# Patient Record
Sex: FEMALE | Race: WHITE | Employment: FULL TIME | URBAN - METROPOLITAN AREA
[De-identification: names, ages, dates, MRNs, and addresses within clinical notes are randomized per-mention and may not be internally consistent; named-entity substitution may affect disease eponyms.]

---

## 2019-01-24 ENCOUNTER — HOSPITAL ENCOUNTER (OUTPATIENT)
Age: 34
Setting detail: SPECIMEN
Discharge: HOME | End: 2019-01-24
Payer: MEDICARE

## 2019-01-24 ENCOUNTER — OFFICE VISIT (OUTPATIENT)
Dept: OBGYN CLINIC | Age: 34
End: 2019-01-24
Payer: MEDICARE

## 2019-01-24 VITALS
BODY MASS INDEX: 30.98 KG/M2 | DIASTOLIC BLOOD PRESSURE: 79 MMHG | HEART RATE: 80 BPM | TEMPERATURE: 98 F | RESPIRATION RATE: 18 BRPM | HEIGHT: 67 IN | WEIGHT: 197.4 LBS | SYSTOLIC BLOOD PRESSURE: 120 MMHG

## 2019-01-24 DIAGNOSIS — Z01.419 WOMEN'S ANNUAL ROUTINE GYNECOLOGICAL EXAMINATION: ICD-10-CM

## 2019-01-24 DIAGNOSIS — Z11.3 SCREENING FOR STDS (SEXUALLY TRANSMITTED DISEASES): ICD-10-CM

## 2019-01-24 DIAGNOSIS — Z12.4 CERVICAL CANCER SCREENING: ICD-10-CM

## 2019-01-24 DIAGNOSIS — Z01.419 WOMEN'S ANNUAL ROUTINE GYNECOLOGICAL EXAMINATION: Primary | ICD-10-CM

## 2019-01-24 DIAGNOSIS — B37.2 SKIN YEAST INFECTION: ICD-10-CM

## 2019-01-24 LAB
DIRECT EXAM: ABNORMAL
Lab: ABNORMAL
SPECIMEN DESCRIPTION: ABNORMAL
STATUS: ABNORMAL

## 2019-01-24 PROCEDURE — G8484 FLU IMMUNIZE NO ADMIN: HCPCS | Performed by: CLINICAL NURSE SPECIALIST

## 2019-01-24 PROCEDURE — 99385 PREV VISIT NEW AGE 18-39: CPT | Performed by: CLINICAL NURSE SPECIALIST

## 2019-01-24 RX ORDER — METRONIDAZOLE 500 MG/1
500 TABLET ORAL 2 TIMES DAILY
Qty: 14 TABLET | Refills: 0 | Status: SHIPPED | OUTPATIENT
Start: 2019-01-24 | End: 2019-01-31

## 2019-01-24 RX ORDER — NYSTATIN 100000 U/G
CREAM TOPICAL
Qty: 30 G | Refills: 1 | Status: SHIPPED | OUTPATIENT
Start: 2019-01-24 | End: 2019-02-22 | Stop reason: ALTCHOICE

## 2019-01-24 RX ORDER — FLUCONAZOLE 100 MG/1
100 TABLET ORAL DAILY
Qty: 7 TABLET | Refills: 0 | Status: SHIPPED | OUTPATIENT
Start: 2019-01-24 | End: 2019-01-31

## 2019-01-25 LAB
C TRACH DNA GENITAL QL NAA+PROBE: NEGATIVE
N. GONORRHOEAE DNA: NEGATIVE

## 2019-01-28 LAB
HPV SAMPLE: NORMAL
HPV SOURCE: NORMAL
HPV, GENOTYPE 16: NOT DETECTED
HPV, GENOTYPE 18: NOT DETECTED
HPV, HIGH RISK OTHER: NOT DETECTED
HPV, INTERPRETATION: NORMAL

## 2019-02-08 LAB — CYTOLOGY REPORT: NORMAL

## 2019-02-22 ENCOUNTER — HOSPITAL ENCOUNTER (OUTPATIENT)
Age: 34
Setting detail: SPECIMEN
Discharge: HOME | End: 2019-02-22
Payer: MEDICARE

## 2019-02-22 ENCOUNTER — OFFICE VISIT (OUTPATIENT)
Dept: FAMILY MEDICINE CLINIC | Age: 34
End: 2019-02-22
Payer: MEDICARE

## 2019-02-22 VITALS
SYSTOLIC BLOOD PRESSURE: 122 MMHG | HEART RATE: 78 BPM | OXYGEN SATURATION: 100 % | TEMPERATURE: 98.1 F | RESPIRATION RATE: 16 BRPM | DIASTOLIC BLOOD PRESSURE: 78 MMHG | HEIGHT: 65 IN | WEIGHT: 204.2 LBS | BODY MASS INDEX: 34.02 KG/M2

## 2019-02-22 DIAGNOSIS — Z72.0 TOBACCO ABUSE: ICD-10-CM

## 2019-02-22 DIAGNOSIS — Z13.1 SCREENING FOR DIABETES MELLITUS: ICD-10-CM

## 2019-02-22 DIAGNOSIS — Z13.220 SCREENING FOR HYPERLIPIDEMIA: ICD-10-CM

## 2019-02-22 DIAGNOSIS — Z13.0 SCREENING FOR DEFICIENCY ANEMIA: ICD-10-CM

## 2019-02-22 DIAGNOSIS — Z13.29 SCREENING FOR THYROID DISORDER: ICD-10-CM

## 2019-02-22 DIAGNOSIS — D68.51 FACTOR V LEIDEN MUTATION (HCC): ICD-10-CM

## 2019-02-22 DIAGNOSIS — F31.60 BIPOLAR AFFECTIVE DISORDER, CURRENT EPISODE MIXED, CURRENT EPISODE SEVERITY UNSPECIFIED (HCC): Primary | ICD-10-CM

## 2019-02-22 DIAGNOSIS — D68.59 THROMBOPHILIA (HCC): ICD-10-CM

## 2019-02-22 DIAGNOSIS — J44.1 CHRONIC OBSTRUCTIVE PULMONARY DISEASE WITH ACUTE EXACERBATION (HCC): ICD-10-CM

## 2019-02-22 DIAGNOSIS — Z11.4 SCREENING FOR HIV (HUMAN IMMUNODEFICIENCY VIRUS): ICD-10-CM

## 2019-02-22 LAB
ABSOLUTE EOS #: 0.33 K/UL (ref 0–0.44)
ABSOLUTE IMMATURE GRANULOCYTE: <0.03 K/UL (ref 0–0.3)
ABSOLUTE LYMPH #: 2.58 K/UL (ref 1.1–3.7)
ABSOLUTE MONO #: 0.59 K/UL (ref 0.1–1.2)
ALBUMIN SERPL-MCNC: 4.3 G/DL (ref 3.5–5.2)
ALBUMIN/GLOBULIN RATIO: 1.6 (ref 1–2.5)
ALP BLD-CCNC: 67 U/L (ref 35–104)
ALT SERPL-CCNC: 19 U/L (ref 5–33)
ANION GAP SERPL CALCULATED.3IONS-SCNC: 15 MMOL/L (ref 9–17)
AST SERPL-CCNC: 19 U/L
BASOPHILS # BLD: 1 % (ref 0–2)
BASOPHILS ABSOLUTE: 0.04 K/UL (ref 0–0.2)
BILIRUB SERPL-MCNC: 0.32 MG/DL (ref 0.3–1.2)
BUN BLDV-MCNC: 13 MG/DL (ref 6–20)
BUN/CREAT BLD: NORMAL (ref 9–20)
CALCIUM SERPL-MCNC: 9.5 MG/DL (ref 8.6–10.4)
CHLORIDE BLD-SCNC: 105 MMOL/L (ref 98–107)
CHOLESTEROL, FASTING: 220 MG/DL
CHOLESTEROL/HDL RATIO: 3.1
CO2: 24 MMOL/L (ref 20–31)
CREAT SERPL-MCNC: 0.58 MG/DL (ref 0.5–0.9)
DIFFERENTIAL TYPE: NORMAL
EOSINOPHILS RELATIVE PERCENT: 4 % (ref 1–4)
ESTIMATED AVERAGE GLUCOSE: 94 MG/DL
GFR AFRICAN AMERICAN: >60 ML/MIN
GFR NON-AFRICAN AMERICAN: >60 ML/MIN
GFR SERPL CREATININE-BSD FRML MDRD: NORMAL ML/MIN/{1.73_M2}
GFR SERPL CREATININE-BSD FRML MDRD: NORMAL ML/MIN/{1.73_M2}
GLUCOSE BLD-MCNC: 75 MG/DL (ref 70–99)
HBA1C MFR BLD: 4.9 % (ref 4–6)
HCT VFR BLD CALC: 46.6 % (ref 36.3–47.1)
HDLC SERPL-MCNC: 72 MG/DL
HEMOGLOBIN: 15.1 G/DL (ref 11.9–15.1)
HIV AG/AB: NONREACTIVE
IMMATURE GRANULOCYTES: 0 %
LDL CHOLESTEROL: 128 MG/DL (ref 0–130)
LYMPHOCYTES # BLD: 33 % (ref 24–43)
MCH RBC QN AUTO: 30 PG (ref 25.2–33.5)
MCHC RBC AUTO-ENTMCNC: 32.4 G/DL (ref 28.4–34.8)
MCV RBC AUTO: 92.6 FL (ref 82.6–102.9)
MONOCYTES # BLD: 8 % (ref 3–12)
NRBC AUTOMATED: 0 PER 100 WBC
PDW BLD-RTO: 13.2 % (ref 11.8–14.4)
PLATELET # BLD: 260 K/UL (ref 138–453)
PLATELET ESTIMATE: NORMAL
PMV BLD AUTO: 9.6 FL (ref 8.1–13.5)
POTASSIUM SERPL-SCNC: 4.7 MMOL/L (ref 3.7–5.3)
RBC # BLD: 5.03 M/UL (ref 3.95–5.11)
RBC # BLD: NORMAL 10*6/UL
SEG NEUTROPHILS: 54 % (ref 36–65)
SEGMENTED NEUTROPHILS ABSOLUTE COUNT: 4.22 K/UL (ref 1.5–8.1)
SODIUM BLD-SCNC: 144 MMOL/L (ref 135–144)
TOTAL PROTEIN: 7 G/DL (ref 6.4–8.3)
TRIGLYCERIDE, FASTING: 101 MG/DL
TSH SERPL DL<=0.05 MIU/L-ACNC: 2.09 MIU/L (ref 0.3–5)
VLDLC SERPL CALC-MCNC: ABNORMAL MG/DL (ref 1–30)
WBC # BLD: 7.8 K/UL (ref 3.5–11.3)
WBC # BLD: NORMAL 10*3/UL

## 2019-02-22 PROCEDURE — G8417 CALC BMI ABV UP PARAM F/U: HCPCS | Performed by: INTERNAL MEDICINE

## 2019-02-22 PROCEDURE — 4004F PT TOBACCO SCREEN RCVD TLK: CPT | Performed by: INTERNAL MEDICINE

## 2019-02-22 PROCEDURE — G8427 DOCREV CUR MEDS BY ELIG CLIN: HCPCS | Performed by: INTERNAL MEDICINE

## 2019-02-22 PROCEDURE — 99203 OFFICE O/P NEW LOW 30 MIN: CPT | Performed by: INTERNAL MEDICINE

## 2019-02-22 PROCEDURE — G8484 FLU IMMUNIZE NO ADMIN: HCPCS | Performed by: INTERNAL MEDICINE

## 2019-02-22 PROCEDURE — G8926 SPIRO NO PERF OR DOC: HCPCS | Performed by: INTERNAL MEDICINE

## 2019-02-22 PROCEDURE — 3023F SPIROM DOC REV: CPT | Performed by: INTERNAL MEDICINE

## 2019-02-22 RX ORDER — NICOTINE 21 MG/24HR
1 PATCH, TRANSDERMAL 24 HOURS TRANSDERMAL EVERY 24 HOURS
Qty: 30 PATCH | Refills: 3 | Status: SHIPPED | OUTPATIENT
Start: 2019-02-22 | End: 2021-01-12 | Stop reason: SDUPTHER

## 2019-02-22 RX ORDER — MIRTAZAPINE 15 MG/1
15 TABLET, FILM COATED ORAL NIGHTLY
Qty: 30 TABLET | Refills: 0 | Status: SHIPPED | OUTPATIENT
Start: 2019-02-22 | End: 2019-03-08

## 2019-02-22 RX ORDER — DIVALPROEX SODIUM 250 MG/1
250 TABLET, EXTENDED RELEASE ORAL DAILY
Qty: 30 TABLET | Refills: 3 | Status: SHIPPED | OUTPATIENT
Start: 2019-02-22 | End: 2019-03-08

## 2019-02-22 RX ORDER — ALBUTEROL SULFATE 90 UG/1
2 AEROSOL, METERED RESPIRATORY (INHALATION) EVERY 4 HOURS PRN
Qty: 1 INHALER | Refills: 3 | Status: SHIPPED | OUTPATIENT
Start: 2019-02-22 | End: 2019-08-22 | Stop reason: SDUPTHER

## 2019-02-22 ASSESSMENT — PATIENT HEALTH QUESTIONNAIRE - PHQ9
SUM OF ALL RESPONSES TO PHQ9 QUESTIONS 1 & 2: 2
2. FEELING DOWN, DEPRESSED OR HOPELESS: 2
SUM OF ALL RESPONSES TO PHQ QUESTIONS 1-9: 2
1. LITTLE INTEREST OR PLEASURE IN DOING THINGS: 0
SUM OF ALL RESPONSES TO PHQ QUESTIONS 1-9: 2

## 2019-02-28 ENCOUNTER — HOSPITAL ENCOUNTER (OUTPATIENT)
Dept: PULMONOLOGY | Age: 34
Discharge: HOME | End: 2019-02-28
Payer: MEDICARE

## 2019-02-28 DIAGNOSIS — J44.1 CHRONIC OBSTRUCTIVE PULMONARY DISEASE WITH ACUTE EXACERBATION (HCC): ICD-10-CM

## 2019-02-28 PROCEDURE — 99406 BEHAV CHNG SMOKING 3-10 MIN: CPT

## 2019-02-28 PROCEDURE — 94664 DEMO&/EVAL PT USE INHALER: CPT

## 2019-02-28 PROCEDURE — 94727 GAS DIL/WSHOT DETER LNG VOL: CPT

## 2019-02-28 PROCEDURE — 94726 PLETHYSMOGRAPHY LUNG VOLUMES: CPT

## 2019-02-28 PROCEDURE — 94640 AIRWAY INHALATION TREATMENT: CPT

## 2019-02-28 PROCEDURE — 94250 HC DIFFUSION: CPT

## 2019-02-28 PROCEDURE — 94060 EVALUATION OF WHEEZING: CPT

## 2019-03-08 ENCOUNTER — OFFICE VISIT (OUTPATIENT)
Dept: FAMILY MEDICINE CLINIC | Age: 34
End: 2019-03-08
Payer: MEDICARE

## 2019-03-08 VITALS
OXYGEN SATURATION: 98 % | HEART RATE: 99 BPM | BODY MASS INDEX: 34.91 KG/M2 | WEIGHT: 209.8 LBS | DIASTOLIC BLOOD PRESSURE: 66 MMHG | RESPIRATION RATE: 16 BRPM | TEMPERATURE: 97.4 F | SYSTOLIC BLOOD PRESSURE: 104 MMHG

## 2019-03-08 DIAGNOSIS — D68.59 THROMBOPHILIA (HCC): ICD-10-CM

## 2019-03-08 DIAGNOSIS — J44.9 CHRONIC OBSTRUCTIVE PULMONARY DISEASE, UNSPECIFIED COPD TYPE (HCC): ICD-10-CM

## 2019-03-08 DIAGNOSIS — R04.0 ANTERIOR EPISTAXIS: ICD-10-CM

## 2019-03-08 DIAGNOSIS — F31.60 BIPOLAR AFFECTIVE DISORDER, CURRENT EPISODE MIXED, CURRENT EPISODE SEVERITY UNSPECIFIED (HCC): Primary | ICD-10-CM

## 2019-03-08 DIAGNOSIS — D68.51 FACTOR V LEIDEN MUTATION (HCC): ICD-10-CM

## 2019-03-08 DIAGNOSIS — Z72.0 TOBACCO ABUSE: ICD-10-CM

## 2019-03-08 PROCEDURE — 4004F PT TOBACCO SCREEN RCVD TLK: CPT | Performed by: INTERNAL MEDICINE

## 2019-03-08 PROCEDURE — G8484 FLU IMMUNIZE NO ADMIN: HCPCS | Performed by: INTERNAL MEDICINE

## 2019-03-08 PROCEDURE — 3023F SPIROM DOC REV: CPT | Performed by: INTERNAL MEDICINE

## 2019-03-08 PROCEDURE — 99214 OFFICE O/P EST MOD 30 MIN: CPT | Performed by: INTERNAL MEDICINE

## 2019-03-08 PROCEDURE — G8428 CUR MEDS NOT DOCUMENT: HCPCS | Performed by: INTERNAL MEDICINE

## 2019-03-08 PROCEDURE — G8417 CALC BMI ABV UP PARAM F/U: HCPCS | Performed by: INTERNAL MEDICINE

## 2019-03-08 PROCEDURE — G8926 SPIRO NO PERF OR DOC: HCPCS | Performed by: INTERNAL MEDICINE

## 2019-03-08 RX ORDER — ECHINACEA PURPUREA EXTRACT 125 MG
1 TABLET ORAL PRN
Qty: 1 BOTTLE | Refills: 3 | Status: SHIPPED | OUTPATIENT
Start: 2019-03-08 | End: 2020-01-13 | Stop reason: SDUPTHER

## 2019-03-08 RX ORDER — ARIPIPRAZOLE 5 MG/1
5 TABLET ORAL DAILY
Qty: 30 TABLET | Refills: 3 | Status: SHIPPED | OUTPATIENT
Start: 2019-03-08 | End: 2020-01-13 | Stop reason: SDUPTHER

## 2019-03-08 RX ORDER — MIRTAZAPINE 30 MG/1
30 TABLET, FILM COATED ORAL NIGHTLY
Qty: 30 TABLET | Refills: 1 | Status: SHIPPED | OUTPATIENT
Start: 2019-03-08 | End: 2019-04-18 | Stop reason: SDUPTHER

## 2019-03-08 RX ORDER — DIVALPROEX SODIUM 250 MG/1
500 TABLET, EXTENDED RELEASE ORAL DAILY
Qty: 60 TABLET | Refills: 3 | Status: SHIPPED | OUTPATIENT
Start: 2019-03-08 | End: 2020-01-13 | Stop reason: SDUPTHER

## 2019-04-18 ENCOUNTER — OFFICE VISIT (OUTPATIENT)
Dept: FAMILY MEDICINE CLINIC | Age: 34
End: 2019-04-18
Payer: MEDICARE

## 2019-04-18 VITALS
WEIGHT: 217 LBS | BODY MASS INDEX: 36.11 KG/M2 | HEART RATE: 94 BPM | OXYGEN SATURATION: 96 % | TEMPERATURE: 97.8 F | RESPIRATION RATE: 16 BRPM | SYSTOLIC BLOOD PRESSURE: 130 MMHG | DIASTOLIC BLOOD PRESSURE: 76 MMHG

## 2019-04-18 DIAGNOSIS — F31.60 BIPOLAR AFFECTIVE DISORDER, CURRENT EPISODE MIXED, CURRENT EPISODE SEVERITY UNSPECIFIED (HCC): ICD-10-CM

## 2019-04-18 DIAGNOSIS — E66.09 CLASS 2 OBESITY DUE TO EXCESS CALORIES WITHOUT SERIOUS COMORBIDITY WITH BODY MASS INDEX (BMI) OF 36.0 TO 36.9 IN ADULT: ICD-10-CM

## 2019-04-18 DIAGNOSIS — D68.59 THROMBOPHILIA (HCC): ICD-10-CM

## 2019-04-18 DIAGNOSIS — D68.51 FACTOR V LEIDEN MUTATION (HCC): Primary | ICD-10-CM

## 2019-04-18 PROCEDURE — G8417 CALC BMI ABV UP PARAM F/U: HCPCS | Performed by: INTERNAL MEDICINE

## 2019-04-18 PROCEDURE — G8427 DOCREV CUR MEDS BY ELIG CLIN: HCPCS | Performed by: INTERNAL MEDICINE

## 2019-04-18 PROCEDURE — 99214 OFFICE O/P EST MOD 30 MIN: CPT | Performed by: INTERNAL MEDICINE

## 2019-04-18 PROCEDURE — 4004F PT TOBACCO SCREEN RCVD TLK: CPT | Performed by: INTERNAL MEDICINE

## 2019-04-18 NOTE — PROGRESS NOTES
Patient is present for a follow up appt. She states she is having a hard time losing weight. Pt tolerated Abilify well. No other concerns at this time. Visit Information    Have you changed or started any medications since your last visit including any over-the-counter medicines, vitamins, or herbal medicines? no   Have you stopped taking any of your medications? Is so, why? -  no  Are you having any side effects from any of your medications? - no    Have you seen any other physician or provider since your last visit?  no   Have you had any other diagnostic tests since your last visit?  no   Have you been seen in the emergency room and/or had an admission in a hospital since we last saw you?  no   Have you had your routine dental cleaning in the past 6 months? Yes     Do you have an active [employer] account? If no, what is the barrier?   Yes    Patient Care Team:  Kanu Kunz MD as PCP - General (Internal Medicine)    Medical History Review  Past Medical, Family, and Social History reviewed and does not contribute to the patient presenting condition    Health Maintenance   Topic Date Due    Pneumococcal 0-64 years Vaccine (1 of 1 - PPSV23) 02/17/1989    Varicella Vaccine (1 of 2 - 13+ 2-dose series) 02/17/1996    DTaP/Tdap/Td vaccine (1 - Tdap) 02/22/2020 (Originally 2/17/2002)    Flu vaccine (Season Ended) 02/22/2020 (Originally 9/1/2019)    Cervical cancer screen  01/24/2024    HIV screen  Completed

## 2019-04-18 NOTE — PATIENT INSTRUCTIONS
Download apps to help remind you to take your medicine - Medisafe, D[employer], [employer], [employer] etc. are a few of the ones available for free

## 2019-04-18 NOTE — PROGRESS NOTES
Subjective:       Patient ID: Conner Ross is a 37 y.o. female who presents for   Chief Complaint   Patient presents with    1 Month Follow-Up    Weight Loss     patient is haivng a hard time losing        HPI:  Nursing note reviewed and discussed with patient. Factor V leiden - started xarelto, no gingival bleeding. She reports blood when she blows her nose. She has always bruised easily, that hasn't changed. She had to take a two hour drive two days in a row 3/6 and 3/7 and thereafter noted that she had left leg pain and swelling in the calf. Breathing is the same, using spiriva daily, she reports the albuterol is helping, using once daily   She is smoking still 2-3 cig/day, down from half pack per day   Feels good on the abilify when she remembers to take it - forgets to take it half the time or more. Occasional alcohol - mostly beer - no alcohol since last visit   Marijuana when she feels she cant get enough sleep, 3-4 times a week for a while --> none since LV   Menses regular, no intermenstrual spotting. Had endometrial ablation in , s/p tubal ligation           Patient's medications, allergies, past medical, surgical, social and family histories were reviewed and updated as appropriate. Social History     Tobacco Use    Smoking status: Current Every Day Smoker     Packs/day: 0.05     Years: 25.00     Pack years: 1.25     Types: Cigarettes     Start date: 10/24/1993    Smokeless tobacco: Never Used   Substance Use Topics    Alcohol use: Yes     Comment: occasional         Review of Systems  Energy level good overall, and weight is stable. No chest pain or shortness of breath.     Bowels have been normal without constipation or diarrhea         Objective:        Physical Exam:  /76 (Site: Left Upper Arm, Position: Sitting, Cuff Size: Large Adult)   Pulse 94   Temp 97.8 °F (36.6 °C) (Oral)   Resp 16   Wt 217 lb (98.4 kg)   LMP  (LMP Unknown)   SpO2 96%   BMI 36.11 kg/m²   Wt Readings from Last 3 Encounters:   04/18/19 217 lb (98.4 kg)   03/08/19 209 lb 12.8 oz (95.2 kg)   02/22/19 204 lb 3.2 oz (92.6 kg)       Physical Exam   Constitutional: She is oriented to person, place, and time. She appears well-developed and well-nourished and in no acute distress. Head: Normocephalic and atraumatic. Eyes: EOM are normal. Pupils are equal, round, and reactive to light. Right Ear: External ear normal.   Left Ear: External ear normal.   Nose: pink, non-edematous mucosa. Throat: no erythema, tonsillar hypertrophy or exudate  Neck: Normal range of motion. Neck supple. No tracheal deviation present. Cardiovascular: Normal rate and regular rhythm, no murmur, rub, or gallop    Pulmonary/Chest: Effort normal and breath sounds normal. No rales or wheezes. No chest retraction. Abdomen: Soft. No tenderness. Musculoskeletal: Normal range of motion. Normal gait and station   Neurological: CN II-XII grossly intact, no focal neurological deficits   Skin: Skin is warm and dry. No obvious lesion on exposed skin. Psychiatric: mood appears euthymic, affect appears normal, she does not appear to be responding to internal stimuli. Prior to Visit Medications    Medication Sig Taking?  Authorizing Provider   Humidifiers (COOL MIST HUMIDIFIER) MISC 1 each by Does not apply route daily Yes Cole Harris MD   sodium chloride (ALTAMIST SPRAY) 0.65 % nasal spray 1 spray by Nasal route as needed for Congestion Yes Juliana Reveles MD   mirtazapine (REMERON) 30 MG tablet Take 1 tablet by mouth nightly Yes Cole Harris MD   divalproex (DEPAKOTE ER) 250 MG extended release tablet Take 2 tablets by mouth daily Yes Cole Harris MD   ARIPiprazole (ABILIFY) 5 MG tablet Take 1 tablet by mouth daily Yes Cole Harris MD   nicotine (NICODERM CQ) 21 MG/24HR Place 1 patch onto the skin every 24 hours Yes Juliana Reveles MD   albuterol sulfate HFA (VENTOLIN HFA) 108 (90 Base) MCG/ACT inhaler Inhale 2 puffs into the lungs every 4 hours as needed for Wheezing Yes Juliana Malin MD   tiotropium (SPIRIVA HANDIHALER) 18 MCG inhalation capsule Inhale 1 capsule into the lungs daily Yes Kasey Tejeda MD   rivaroxaban (XARELTO) 20 MG TABS tablet Take 1 tablet by mouth Daily with supper Yes Kasey Tejeda MD       Data Review      Assessment/Plan:     1. Bipolar affective disorder, current episode mixed, current episode severity unspecified (HCC)  - mirtazapine (REMERON) 30 MG tablet; Take 1 tablet by mouth nightly  Dispense: 30 tablet; Refill: 1  - divalproex (DEPAKOTE ER) 250 MG extended release tablet; Take 2 tablets by mouth daily  Dispense: 60 tablet; Refill: 3  - ARIPiprazole (ABILIFY) 5 MG tablet; Take 1 tablet by mouth daily  Dispense: 30 tablet; Refill: 3    2. Factor V Leiden mutation ([facility]  Continue xarelto     3. Thrombophilia ([facility]  Continue xarelto     4. Chronic obstructive pulmonary disease, unspecified COPD type ([facility]  Continue current inhalers   Continue to cut back on smoking     5. Tobacco abuse  Continue to cut back on smoking     6.  Anterior epistaxis  Humidifier and nasal saline advised         Electronically signed by Kalie Hanks MD on 4/18/2019 at 10:45 AM

## 2019-04-20 RX ORDER — MIRTAZAPINE 30 MG/1
30 TABLET, FILM COATED ORAL NIGHTLY
Qty: 30 TABLET | Refills: 5 | Status: SHIPPED | OUTPATIENT
Start: 2019-04-20 | End: 2020-01-13 | Stop reason: SDUPTHER

## 2019-06-19 ENCOUNTER — APPOINTMENT (OUTPATIENT)
Dept: GENERAL RADIOLOGY | Age: 34
End: 2019-06-19
Payer: MEDICARE

## 2019-06-19 ENCOUNTER — TELEPHONE (OUTPATIENT)
Dept: FAMILY MEDICINE CLINIC | Age: 34
End: 2019-06-19

## 2019-06-19 ENCOUNTER — HOSPITAL ENCOUNTER (EMERGENCY)
Age: 34
Discharge: HOME | End: 2019-06-19
Attending: EMERGENCY MEDICINE
Payer: MEDICARE

## 2019-06-19 VITALS
HEIGHT: 65 IN | HEART RATE: 97 BPM | SYSTOLIC BLOOD PRESSURE: 155 MMHG | OXYGEN SATURATION: 97 % | RESPIRATION RATE: 15 BRPM | BODY MASS INDEX: 36.15 KG/M2 | DIASTOLIC BLOOD PRESSURE: 84 MMHG | WEIGHT: 217 LBS | TEMPERATURE: 98.4 F

## 2019-06-19 DIAGNOSIS — R06.00 DYSPNEA, UNSPECIFIED TYPE: ICD-10-CM

## 2019-06-19 DIAGNOSIS — R07.89 CHEST WALL PAIN: Primary | ICD-10-CM

## 2019-06-19 LAB
-: ABNORMAL
ABSOLUTE EOS #: 0.37 K/UL (ref 0–0.44)
ABSOLUTE IMMATURE GRANULOCYTE: 0.03 K/UL (ref 0–0.3)
ABSOLUTE LYMPH #: 2.94 K/UL (ref 1.1–3.7)
ABSOLUTE MONO #: 0.62 K/UL (ref 0.1–1.2)
AMORPHOUS: ABNORMAL
ANION GAP SERPL CALCULATED.3IONS-SCNC: 15 MMOL/L (ref 9–17)
BACTERIA: ABNORMAL
BASOPHILS # BLD: 1 % (ref 0–2)
BASOPHILS ABSOLUTE: 0.06 K/UL (ref 0–0.2)
BILIRUBIN URINE: NEGATIVE
BUN BLDV-MCNC: 15 MG/DL (ref 6–20)
BUN/CREAT BLD: ABNORMAL (ref 9–20)
CALCIUM SERPL-MCNC: 9.2 MG/DL (ref 8.6–10.4)
CASTS UA: ABNORMAL /LPF (ref 0–8)
CHLORIDE BLD-SCNC: 106 MMOL/L (ref 98–107)
CHP ED QC CHECK: YES
CO2: 22 MMOL/L (ref 20–31)
COLOR: YELLOW
CREAT SERPL-MCNC: 0.61 MG/DL (ref 0.5–0.9)
CRYSTALS, UA: ABNORMAL /HPF
DIFFERENTIAL TYPE: NORMAL
EOSINOPHILS RELATIVE PERCENT: 4 % (ref 1–4)
EPITHELIAL CELLS UA: ABNORMAL /HPF (ref 0–5)
GFR AFRICAN AMERICAN: >60 ML/MIN
GFR NON-AFRICAN AMERICAN: >60 ML/MIN
GFR SERPL CREATININE-BSD FRML MDRD: ABNORMAL ML/MIN/{1.73_M2}
GFR SERPL CREATININE-BSD FRML MDRD: ABNORMAL ML/MIN/{1.73_M2}
GLUCOSE BLD-MCNC: 102 MG/DL (ref 70–99)
GLUCOSE BLD-MCNC: 94 MG/DL
GLUCOSE BLD-MCNC: 94 MG/DL (ref 65–105)
GLUCOSE URINE: NEGATIVE
HCG QUALITATIVE: NEGATIVE
HCT VFR BLD CALC: 43.8 % (ref 36.3–47.1)
HEMOGLOBIN: 14 G/DL (ref 11.9–15.1)
IMMATURE GRANULOCYTES: 0 %
KETONES, URINE: NEGATIVE
LEUKOCYTE ESTERASE, URINE: NEGATIVE
LYMPHOCYTES # BLD: 31 % (ref 24–43)
MCH RBC QN AUTO: 29.5 PG (ref 25.2–33.5)
MCHC RBC AUTO-ENTMCNC: 32 G/DL (ref 28.4–34.8)
MCV RBC AUTO: 92.2 FL (ref 82.6–102.9)
MONOCYTES # BLD: 7 % (ref 3–12)
MUCUS: ABNORMAL
NITRITE, URINE: NEGATIVE
NRBC AUTOMATED: 0 PER 100 WBC
OTHER OBSERVATIONS UA: ABNORMAL
PARTIAL THROMBOPLASTIN TIME: 25.6 SEC (ref 20.5–30.5)
PDW BLD-RTO: 12.9 % (ref 11.8–14.4)
PH UA: 6 (ref 5–8)
PLATELET # BLD: 263 K/UL (ref 138–453)
PLATELET ESTIMATE: NORMAL
PMV BLD AUTO: 9.3 FL (ref 8.1–13.5)
POTASSIUM SERPL-SCNC: 4.2 MMOL/L (ref 3.7–5.3)
PROTEIN UA: NEGATIVE
RBC # BLD: 4.75 M/UL (ref 3.95–5.11)
RBC # BLD: NORMAL 10*6/UL
RBC UA: ABNORMAL /HPF (ref 0–4)
RENAL EPITHELIAL, UA: ABNORMAL /HPF
SEG NEUTROPHILS: 57 % (ref 36–65)
SEGMENTED NEUTROPHILS ABSOLUTE COUNT: 5.42 K/UL (ref 1.5–8.1)
SODIUM BLD-SCNC: 143 MMOL/L (ref 135–144)
SPECIFIC GRAVITY UA: 1.02 (ref 1–1.03)
TRICHOMONAS: ABNORMAL
TROPONIN INTERP: NORMAL
TROPONIN T: NORMAL NG/ML
TROPONIN, HIGH SENSITIVITY: <6 NG/L (ref 0–14)
TURBIDITY: ABNORMAL
URINE HGB: NEGATIVE
UROBILINOGEN, URINE: NORMAL
WBC # BLD: 9.4 K/UL (ref 3.5–11.3)
WBC # BLD: NORMAL 10*3/UL
WBC UA: ABNORMAL /HPF (ref 0–5)
YEAST: ABNORMAL

## 2019-06-19 PROCEDURE — 81001 URINALYSIS AUTO W/SCOPE: CPT

## 2019-06-19 PROCEDURE — 96375 TX/PRO/DX INJ NEW DRUG ADDON: CPT

## 2019-06-19 PROCEDURE — 80048 BASIC METABOLIC PNL TOTAL CA: CPT

## 2019-06-19 PROCEDURE — 6370000000 HC RX 637 (ALT 250 FOR IP): Performed by: STUDENT IN AN ORGANIZED HEALTH CARE EDUCATION/TRAINING PROGRAM

## 2019-06-19 PROCEDURE — 82947 ASSAY GLUCOSE BLOOD QUANT: CPT

## 2019-06-19 PROCEDURE — 2500000003 HC RX 250 WO HCPCS: Performed by: STUDENT IN AN ORGANIZED HEALTH CARE EDUCATION/TRAINING PROGRAM

## 2019-06-19 PROCEDURE — 93005 ELECTROCARDIOGRAM TRACING: CPT | Performed by: STUDENT IN AN ORGANIZED HEALTH CARE EDUCATION/TRAINING PROGRAM

## 2019-06-19 PROCEDURE — 84703 CHORIONIC GONADOTROPIN ASSAY: CPT

## 2019-06-19 PROCEDURE — 6360000002 HC RX W HCPCS: Performed by: STUDENT IN AN ORGANIZED HEALTH CARE EDUCATION/TRAINING PROGRAM

## 2019-06-19 PROCEDURE — 73080 X-RAY EXAM OF ELBOW: CPT

## 2019-06-19 PROCEDURE — 96374 THER/PROPH/DIAG INJ IV PUSH: CPT

## 2019-06-19 PROCEDURE — 71045 X-RAY EXAM CHEST 1 VIEW: CPT

## 2019-06-19 PROCEDURE — 85025 COMPLETE CBC W/AUTO DIFF WBC: CPT

## 2019-06-19 PROCEDURE — 84484 ASSAY OF TROPONIN QUANT: CPT

## 2019-06-19 PROCEDURE — 74018 RADEX ABDOMEN 1 VIEW: CPT

## 2019-06-19 PROCEDURE — 87086 URINE CULTURE/COLONY COUNT: CPT

## 2019-06-19 PROCEDURE — 99285 EMERGENCY DEPT VISIT HI MDM: CPT

## 2019-06-19 PROCEDURE — 85730 THROMBOPLASTIN TIME PARTIAL: CPT

## 2019-06-19 RX ORDER — ALBUTEROL SULFATE 2.5 MG/3ML
5 SOLUTION RESPIRATORY (INHALATION)
Status: DISCONTINUED | OUTPATIENT
Start: 2019-06-19 | End: 2019-06-19 | Stop reason: HOSPADM

## 2019-06-19 RX ORDER — ALBUTEROL SULFATE 90 UG/1
2 AEROSOL, METERED RESPIRATORY (INHALATION)
Status: DISCONTINUED | OUTPATIENT
Start: 2019-06-19 | End: 2019-06-19 | Stop reason: HOSPADM

## 2019-06-19 RX ORDER — IPRATROPIUM BROMIDE AND ALBUTEROL SULFATE 2.5; .5 MG/3ML; MG/3ML
1 SOLUTION RESPIRATORY (INHALATION)
Status: DISCONTINUED | OUTPATIENT
Start: 2019-06-19 | End: 2019-06-19 | Stop reason: HOSPADM

## 2019-06-19 RX ORDER — KETOROLAC TROMETHAMINE 15 MG/ML
15 INJECTION, SOLUTION INTRAMUSCULAR; INTRAVENOUS ONCE
Status: COMPLETED | OUTPATIENT
Start: 2019-06-19 | End: 2019-06-19

## 2019-06-19 RX ORDER — MAGNESIUM HYDROXIDE/ALUMINUM HYDROXICE/SIMETHICONE 120; 1200; 1200 MG/30ML; MG/30ML; MG/30ML
30 SUSPENSION ORAL
Status: COMPLETED | OUTPATIENT
Start: 2019-06-19 | End: 2019-06-19

## 2019-06-19 RX ORDER — IBUPROFEN 800 MG/1
800 TABLET ORAL EVERY 8 HOURS PRN
Qty: 30 TABLET | Refills: 0 | Status: SHIPPED | OUTPATIENT
Start: 2019-06-19 | End: 2019-08-26

## 2019-06-19 RX ADMIN — FAMOTIDINE 20 MG: 10 INJECTION, SOLUTION INTRAVENOUS at 14:21

## 2019-06-19 RX ADMIN — KETOROLAC TROMETHAMINE 15 MG: 15 INJECTION, SOLUTION INTRAMUSCULAR; INTRAVENOUS at 16:10

## 2019-06-19 RX ADMIN — ALUMINUM HYDROXIDE, MAGNESIUM HYDROXIDE, AND SIMETHICONE 30 ML: 200; 200; 20 SUSPENSION ORAL at 14:20

## 2019-06-19 ASSESSMENT — ENCOUNTER SYMPTOMS
SORE THROAT: 0
PHOTOPHOBIA: 0
BACK PAIN: 0
WHEEZING: 0
VOMITING: 0
CHEST TIGHTNESS: 0
SHORTNESS OF BREATH: 1
ABDOMINAL PAIN: 0
COUGH: 0
TROUBLE SWALLOWING: 0
NAUSEA: 0

## 2019-06-19 ASSESSMENT — PAIN SCALES - GENERAL
PAINLEVEL_OUTOF10: 3
PAINLEVEL_OUTOF10: 5

## 2019-06-19 ASSESSMENT — PAIN DESCRIPTION - LOCATION: LOCATION: ABDOMEN

## 2019-06-19 NOTE — ED PROVIDER NOTES
[facility] ED  Emergency Department Encounter  EmergencyMedicine Resident     Pt Name:Carolynn Cruz  MRN: [id number]  [place] 1985  Date of evaluation: 6/19/19  PCP:  Opal Mercedes MD    [address] [place] [address]       Chief Complaint   Patient presents with    Shortness of Breath       HISTORY OF PRESENT ILLNESS  (Location/Symptom, Timing/Onset, Context/Setting, Quality, Duration, Modifying Factors, Severity.)      Jeb Hays is a 37 y.o. female who presents with shortness of breath for approximately 1 month. Patient complaining of intermittent episodes of shortness of breath not associated with activity. Patient also complaining of left sided chest pain that worsens with change in position, mainly laying on her left side. Patient denying any trauma to the chest or recent injuries to the chest wall, however she states that she did fall 2 days ago and fell onto her right elbow. Patient denies any loss of function of the right hand/elbow. Patient states that she does have some discomfort when straightening her elbow. Patient with recent diagnosis of factor V Leyden and is currently on Xarelto. She states that she has been pliant with her medication. Patient denies any fvers, chills, sweats, nausea, vomiting. PAST MEDICAL / SURGICAL / SOCIAL / FAMILY HISTORY      has a past medical history of Depression, Overactive bladder, Seizures ([address] and Trauma. has a past surgical history that includes Tubal ligation (09/09/2009); Cholecystectomy (02/03/2011); ablation of dysrhythmic focus (2013); and IVC filter insertion.     Social History     Socioeconomic History    Marital status: Single     Spouse name: Not on file    Number of children: Not on file    Years of education: Not on file    Highest education level: Not on file   Occupational History    Not on file   Social Needs    Financial resource strain: Not on file    Food insecurity:     Worry: Not on file Inability: Not on file    Transportation needs:     Medical: Not on file     Non-medical: Not on file   Tobacco Use    Smoking status: Current Every Day Smoker     Packs/day: 0.05     Years: 25.00     Pack years: 1.25     Types: Cigarettes     Start date: 10/24/1993    Smokeless tobacco: Never Used   Substance and Sexual Activity    Alcohol use: Yes     Comment: occasional     Drug use: Yes     Types: Marijuana    Sexual activity: Yes   Lifestyle    Physical activity:     Days per week: Not on file     Minutes per session: Not on file    Stress: Not on file   Relationships    Social connections:     Talks on phone: Not on file     Gets together: Not on file     Attends Spiritism service: Not on file     Active member of club or organization: Not on file     Attends meetings of clubs or organizations: Not on file     Relationship status: Not on file    Intimate partner violence:     Fear of current or ex partner: Not on file     Emotionally abused: Not on file     Physically abused: Not on file     Forced sexual activity: Not on file   Other Topics Concern    Not on file   Social History Narrative    Not on file       Family History   Problem Relation Age of Onset    Diabetes Paternal Grandfather     Diabetes Father     Hypertension Father     Cancer Mother     Cancer Brother        Allergies:  Morphine and related; Bactrim [sulfamethoxazole-trimethoprim]; Clindamycin/lincomycin; and Vicodin [hydrocodone-acetaminophen]    Home Medications:  Prior to Admission medications    Medication Sig Start Date End Date Taking?  Authorizing Provider   ibuprofen (ADVIL;MOTRIN) 800 MG tablet Take 1 tablet by mouth every 8 hours as needed for Pain 6/19/19  Yes Khoi Beard MD   mirtazapine (REMERON) 30 MG tablet Take 1 tablet by mouth nightly 4/20/19   Juliana Davis MD   Humidifiers (COOL MIST HUMIDIFIER) [address] 1 each by Does not apply route daily 3/8/19   Juliana Davis MD   sodium chloride (ALTAMIST SPRAY) 0.65 % nasal spray 1 spray by Nasal route as needed for Congestion 3/8/19   Juliana Bennett MD   divalproex (DEPAKOTE ER) 250 MG extended release tablet Take 2 tablets by mouth daily 3/8/19   Juliana Bennett MD   ARIPiprazole (ABILIFY) 5 MG tablet Take 1 tablet by mouth daily 3/8/19   Juliana Bennett MD   nicotine (NICODERM CQ) 21 MG/24HR Place 1 patch onto the skin every 24 hours 2/22/19 2/22/20  Juliana Bennett MD   albuterol sulfate HFA (VENTOLIN HFA) 108 (90 Base) MCG/ACT inhaler Inhale 2 puffs into the lungs every 4 hours as needed for Wheezing 2/22/19 2/22/20  Juliana Bennett MD   tiotropium (Yonis Arnulfo) 18 MCG inhalation capsule Inhale 1 capsule into the lungs daily 2/22/19   Juliana Bennett MD   rivaroxaban (XARELTO) 20 MG TABS tablet Take 1 tablet by mouth Daily with supper 2/22/19   Juliana Bennett MD       REVIEW OF SYSTEMS    (2-9 systems for level 4, 10 or more for level 5)      Review of Systems   Constitutional: Negative for chills and fever. HENT: Negative for sore throat and trouble swallowing. Eyes: Negative for photophobia. Respiratory: Positive for shortness of breath. Negative for cough, chest tightness and wheezing. Cardiovascular: Positive for chest pain. Negative for palpitations. Gastrointestinal: Negative for abdominal pain, nausea and vomiting. Endocrine: Negative for polyuria. Genitourinary: Negative for dysuria and flank pain. Musculoskeletal: Negative for back pain and neck pain. Skin: Negative for rash and wound. Neurological: Negative for syncope, weakness, light-headedness and headaches. Psychiatric/Behavioral: Negative for agitation and confusion.        PHYSICAL EXAM   (up to 7 for level 4, 8 or more for level 5)      INITIAL VITALS:   BP (!) 155/84   Pulse 97   Temp 98.4 °F (36.9 °C) (Oral)   Resp 15   Ht 5' 5\" (1.651 m)   Wt 217 lb (98.4 kg)   SpO2 97%   BMI 36.11 kg/m²     Physical Exam   Constitutional: She is oriented to person, place, and time. She appears well-developed and well-nourished. HENT:   Head: Normocephalic and atraumatic. Nose: Nose normal.   Mouth/Throat: Oropharynx is clear and moist.   Eyes: Pupils are equal, round, and reactive to light. EOM are normal.   Neck: Normal range of motion. Neck supple. Cardiovascular: Normal rate, regular rhythm, normal heart sounds and intact distal pulses. Pulmonary/Chest: Breath sounds normal. No respiratory distress. She has no wheezes. She has no rales. Is clear bilaterally, no wheezing. Abdominal: Soft. Bowel sounds are normal. She exhibits no distension. There is no tenderness. Musculoskeletal: Normal range of motion. She exhibits no edema or deformity. Point tenderness to the right olecranon process. Normal straightening of the elbow joint.  strength 5 out of 5, sensory function intact. No obvious deformities to the elbow joint. No abnormalities of the shoulder joint. Neurological: She is alert and oriented to person, place, and time. She has normal reflexes. Skin: Skin is warm and dry. No rash noted. No erythema. Psychiatric: She has a normal mood and affect.  Her behavior is normal.       DIFFERENTIAL  DIAGNOSIS     PLAN (LABS / IMAGING / EKG):  Orders Placed This Encounter   Procedures    Urine Culture    XR CHEST PORTABLE    XR ABDOMEN (KUB) (SINGLE AP VIEW)    XR ELBOW RIGHT (MIN 3 VIEWS)    Basic Metabolic Panel    CBC Auto Differential    APTT    Urinalysis with Microscopic    HCG Qualitative, Serum    Troponin    POCT Glucose    POC Glucose Fingerstick    EKG 12 Lead    EKG REPORT    Insert peripheral IV       MEDICATIONS ORDERED:  Orders Placed This Encounter   Medications    famotidine (PEPCID) injection 20 mg    aluminum & magnesium hydroxide-simethicone (MAALOX) 200-200-20 MG/5ML suspension 30 mL    DISCONTD: albuterol (PROVENTIL) nebulizer solution 5 mg    DISCONTD: ipratropium-albuterol (DUONEB) nebulizer solution 1 thisnote were completed with a voice recognition program.  Efforts were made to edit the dictations but occasionally words are mis-transcribed.)       Eryn Perkins MD  06/19/19 [id number]       Eryn Perkins MD  06/23/19 [id number]

## 2019-06-19 NOTE — ED NOTES
Pt came into ER in NAD with steady gait. Pt reports SOB that has been on going for a couple months. Pt states her PCP told her to come into ER if SOB did not improve. Pt reports a \"crushing\" feeling on her left side when she lays on left side. Pt states she has hx of CPOD and her PCP states she might have small blood clots in lungs. Pt is on  Blood thinners. Pt states abdominal pain that is on both right and left side of abdomen. Pt denies N/V. Pt states being very thirsty. Pt resting in bed in NAD with even and unlabored rr.  Pt on cardiac monitor, will continue to assess       Dawna Schuler RN  06/19/19 [id number]

## 2019-06-19 NOTE — ED PROVIDER NOTES
[facility]     Emergency Department     Faculty Attestation    I performed a history and physical examination of the patient and discussed management with the resident. I reviewed the residents note and agree with the documented findings and plan of care. Any areas of disagreement are noted on the chart. I was personally present for the key portions of any procedures. I have documented in the chart those procedures where I was not present during the key portions. I have reviewed the emergency nurses triage note. I agree with the chief complaint, past medical history, past surgical history, allergies, medications, social and family history as documented unless otherwise noted below. For Physician Assistant/ Nurse Practitioner cases/documentation I have personally evaluated this patient and have completed at least one if not all key elements of the E/M (history, physical exam, and MDM). Additional findings are as noted. Patient presents with shortness of breath that she has had for the past couple months but she is not having worse when she lies flat. She also complains of right upper quadrant abdominal pain that she has had for the past 2 days. She denies fever, chills, cough, nausea, vomiting, dysuria, diarrhea, constipation, vaginal bleeding or discharge. Patient does have a history of factor V Leiden and is on Xarelto and has a Viola filter. She says she has been compliant with the Xarelto. Exam, patient is resting comfortably in the bed. Lungs are clear to auscultation bilaterally heart sounds are normal.  Abdomen is soft with mild right-sided tenderness. No rebound or guarding is present. Will get EKG, chest x-ray, KUB, labs and reassess.     EKG Interpretation    Interpreted by emergency department physician    Rhythm: normal sinus   Rate: normal  Axis: normal  Ectopy: none  Conduction: normal  ST Segments: normal  T Waves: normal  Q Waves: none    Clinical Impression:

## 2019-06-20 LAB
CULTURE: NORMAL
Lab: NORMAL
SPECIMEN DESCRIPTION: NORMAL

## 2019-06-21 LAB
EKG ATRIAL RATE: 86 BPM
EKG P AXIS: 36 DEGREES
EKG P-R INTERVAL: 130 MS
EKG Q-T INTERVAL: 376 MS
EKG QRS DURATION: 80 MS
EKG QTC CALCULATION (BAZETT): 449 MS
EKG R AXIS: 30 DEGREES
EKG T AXIS: 46 DEGREES
EKG VENTRICULAR RATE: 86 BPM

## 2019-06-21 PROCEDURE — 93010 ELECTROCARDIOGRAM REPORT: CPT | Performed by: INTERNAL MEDICINE

## 2019-08-22 DIAGNOSIS — J44.1 CHRONIC OBSTRUCTIVE PULMONARY DISEASE WITH ACUTE EXACERBATION (HCC): ICD-10-CM

## 2019-08-22 DIAGNOSIS — D68.59 THROMBOPHILIA (HCC): ICD-10-CM

## 2019-08-22 DIAGNOSIS — D68.51 FACTOR V LEIDEN MUTATION (HCC): ICD-10-CM

## 2019-08-26 RX ORDER — TIOTROPIUM BROMIDE 18 UG/1
CAPSULE ORAL; RESPIRATORY (INHALATION)
Qty: 30 CAPSULE | Refills: 5 | Status: SHIPPED | OUTPATIENT
Start: 2019-08-26 | End: 2020-01-13 | Stop reason: SDUPTHER

## 2019-08-26 RX ORDER — ALBUTEROL SULFATE 90 UG/1
AEROSOL, METERED RESPIRATORY (INHALATION)
Qty: 1 INHALER | Refills: 5 | Status: SHIPPED | OUTPATIENT
Start: 2019-08-26 | End: 2020-11-09

## 2019-08-26 RX ORDER — RIVAROXABAN 20 MG/1
TABLET, FILM COATED ORAL
Qty: 90 TABLET | Refills: 1 | Status: SHIPPED | OUTPATIENT
Start: 2019-08-26 | End: 2020-01-13 | Stop reason: SDUPTHER

## 2020-01-13 ENCOUNTER — OFFICE VISIT (OUTPATIENT)
Dept: FAMILY MEDICINE CLINIC | Age: 35
End: 2020-01-13
Payer: MEDICARE

## 2020-01-13 ENCOUNTER — HOSPITAL ENCOUNTER (OUTPATIENT)
Age: 35
Setting detail: SPECIMEN
Discharge: HOME | End: 2020-01-13
Payer: MEDICARE

## 2020-01-13 VITALS
OXYGEN SATURATION: 98 % | HEIGHT: 65 IN | WEIGHT: 229.2 LBS | SYSTOLIC BLOOD PRESSURE: 122 MMHG | BODY MASS INDEX: 38.19 KG/M2 | DIASTOLIC BLOOD PRESSURE: 78 MMHG | HEART RATE: 100 BPM

## 2020-01-13 LAB
ALBUMIN SERPL-MCNC: 4.1 G/DL (ref 3.5–5.2)
ALBUMIN/GLOBULIN RATIO: 1.4 (ref 1–2.5)
ALP BLD-CCNC: 89 U/L (ref 35–104)
ALT SERPL-CCNC: 29 U/L (ref 5–33)
AST SERPL-CCNC: 23 U/L
BILIRUB SERPL-MCNC: 0.29 MG/DL (ref 0.3–1.2)
BILIRUBIN DIRECT: 0.08 MG/DL
BILIRUBIN, INDIRECT: 0.21 MG/DL (ref 0–1)
GLOBULIN: ABNORMAL G/DL (ref 1.5–3.8)
HAV IGM SER IA-ACNC: NONREACTIVE
HEPATITIS B CORE IGM ANTIBODY: NONREACTIVE
HEPATITIS B SURFACE ANTIGEN: NONREACTIVE
HEPATITIS C ANTIBODY: NONREACTIVE
HIV AG/AB: NONREACTIVE
TOTAL PROTEIN: 7 G/DL (ref 6.4–8.3)

## 2020-01-13 PROCEDURE — G8427 DOCREV CUR MEDS BY ELIG CLIN: HCPCS | Performed by: INTERNAL MEDICINE

## 2020-01-13 PROCEDURE — 3023F SPIROM DOC REV: CPT | Performed by: INTERNAL MEDICINE

## 2020-01-13 PROCEDURE — 4004F PT TOBACCO SCREEN RCVD TLK: CPT | Performed by: INTERNAL MEDICINE

## 2020-01-13 PROCEDURE — 99214 OFFICE O/P EST MOD 30 MIN: CPT | Performed by: INTERNAL MEDICINE

## 2020-01-13 PROCEDURE — G8484 FLU IMMUNIZE NO ADMIN: HCPCS | Performed by: INTERNAL MEDICINE

## 2020-01-13 PROCEDURE — G8417 CALC BMI ABV UP PARAM F/U: HCPCS | Performed by: INTERNAL MEDICINE

## 2020-01-13 PROCEDURE — G8926 SPIRO NO PERF OR DOC: HCPCS | Performed by: INTERNAL MEDICINE

## 2020-01-13 RX ORDER — ALBUTEROL SULFATE 90 UG/1
2 AEROSOL, METERED RESPIRATORY (INHALATION) EVERY 4 HOURS PRN
Qty: 1 INHALER | Refills: 3 | Status: SHIPPED | OUTPATIENT
Start: 2020-01-13 | End: 2020-09-30 | Stop reason: SDUPTHER

## 2020-01-13 RX ORDER — IBUPROFEN 800 MG/1
800 TABLET ORAL
COMMUNITY
Start: 2020-01-11 | End: 2020-02-10

## 2020-01-13 RX ORDER — MIRTAZAPINE 30 MG/1
30 TABLET, FILM COATED ORAL NIGHTLY
Qty: 30 TABLET | Refills: 5 | Status: SHIPPED | OUTPATIENT
Start: 2020-01-13 | End: 2021-07-23 | Stop reason: SDUPTHER

## 2020-01-13 RX ORDER — DIVALPROEX SODIUM 250 MG/1
500 TABLET, EXTENDED RELEASE ORAL DAILY
Qty: 60 TABLET | Refills: 3 | Status: SHIPPED | OUTPATIENT
Start: 2020-01-13 | End: 2021-07-23 | Stop reason: SDUPTHER

## 2020-01-13 RX ORDER — ECHINACEA PURPUREA EXTRACT 125 MG
1 TABLET ORAL PRN
Qty: 1 BOTTLE | Refills: 3 | Status: SHIPPED | OUTPATIENT
Start: 2020-01-13 | End: 2020-09-30

## 2020-01-13 RX ORDER — ALBUTEROL SULFATE 90 UG/1
AEROSOL, METERED RESPIRATORY (INHALATION)
Qty: 1 INHALER | Refills: 5 | Status: CANCELLED | OUTPATIENT
Start: 2020-01-13

## 2020-01-13 RX ORDER — ARIPIPRAZOLE 5 MG/1
5 TABLET ORAL DAILY
Qty: 30 TABLET | Refills: 3 | Status: SHIPPED | OUTPATIENT
Start: 2020-01-13 | End: 2021-07-23 | Stop reason: SDUPTHER

## 2020-01-13 NOTE — PROGRESS NOTES
Subjective:       Patient ID: Kristen Tubbs is a 37 y.o. female who presents for   Chief Complaint   Patient presents with    Anxiety    Medication Refill       HPI:  Nursing note reviewed and discussed with patient. Here to follow-up   She feels depression is getting worse. She ran out of her meds more than a month ago. She is not sleeping at night. She was seen in the ER 1/11/20 at [facility] for abdominal pain, felt like ovarian cyst.  She was in a relationship with someone recently, just found out he was using drugs, would liek to be tested for HIV and hep C. She ended the relationship. Will be seeing OB for pelvic exam.     Factor V leiden - started xarelto, no gingival bleeding. She reports blood when she blows her nose. She has always bruised easily, that hasn't changed. --> remains on xarelto     Breathing is the same, using spiriva daily, she reports the albuterol is helping, using once daily -->   She is smoking still 2-3 cig/day, down from half pack per day       Patient's medications, allergies, past medical, surgical, social and family histories were reviewed and updated as appropriate. Social History     Tobacco Use    Smoking status: Current Every Day Smoker     Packs/day: 0.05     Years: 25.00     Pack years: 1.25     Types: Cigarettes     Start date: 10/24/1993    Smokeless tobacco: Never Used   Substance Use Topics    Alcohol use: Yes     Comment: occasional         Review of Systems  Energy level good overall, and weight is stable. No chest pain or shortness of breath.     Bowels have been normal without constipation or diarrhea         Objective:        Physical Exam:  /78 (Site: Right Upper Arm, Position: Sitting, Cuff Size: Medium Adult)   Pulse 100   Ht 5' 5\" (1.651 m)   Wt 229 lb 3.2 oz (104 kg)   LMP 12/13/2019   SpO2 98%   BMI 38.14 kg/m²   Wt Readings from Last 3 Encounters:   01/13/20 229 lb 3.2 oz (104 kg)   06/19/19 217 lb (98.4 kg)   04/18/19 217 lb (98.4 kg)       Physical Exam   Constitutional: She is oriented to person, place, and time. She appears well-developed and well-nourished and in no acute distress. Head: Normocephalic and atraumatic. Eyes: EOM are normal. Pupils are equal, round, and reactive to light. Right Ear: External ear normal.   Left Ear: External ear normal.   Nose: pink, non-edematous mucosa. Throat: no erythema, tonsillar hypertrophy or exudate  Neck: Normal range of motion. Neck supple. No tracheal deviation present. Cardiovascular: Normal rate and regular rhythm, no murmur, rub, or gallop    Pulmonary/Chest: Effort normal and breath sounds normal. No rales or wheezes. No chest retraction. Abdomen: Soft. No tenderness. Musculoskeletal: Normal range of motion. Normal gait and station   Neurological: CN II-XII grossly intact, no focal neurological deficits   Skin: Skin is warm and dry. No obvious lesion on exposed skin. Psychiatric: mood appears euthymic, affect appears normal, she does not appear to be responding to internal stimuli. Prior to Visit Medications    Medication Sig Taking?  Authorizing Provider   ibuprofen (ADVIL;MOTRIN) 800 MG tablet Take 800 mg by mouth Yes Historical Provider, MD   XARELTO 20 MG TABS tablet take 1 tablet by mouth once daily WITH SUPPER Yes Juliana Bolden MD   [facility] HAND[facility] 18 MCG inhalation capsule inhale contents of 1 capsule by mouth once daily Yes Juliana Bolden MD   albuterol sulfate  (90 Base) MCG/ACT inhaler inhale 2 puffs by mouth every 4 hours if needed for wheezing Yes Juliana Bolden MD   mirtazapine (REMERON) 30 MG tablet Take 1 tablet by mouth nightly Yes Radha Jarvis MD   Humidifiers (COOL MIST HUMIDIFIER) MISC 1 each by Does not apply route daily Yes Radha Jarvis MD   sodium chloride (ALTAMIST SPRAY) 0.65 % nasal spray 1 spray by Nasal route as needed for Congestion Yes Juliana Bolden MD   divalproex (DEPAKOTE ER) 250 MG extended release tablet Take 2 tablets by mouth daily Yes Juliana Gonzalez MD   ARIPiprazole (ABILIFY) 5 MG tablet Take 1 tablet by mouth daily Yes Javier Ohara MD   nicotine (NICODERM CQ) 21 MG/24HR Place 1 patch onto the skin every 24 hours Yes Javier Ohara MD       Data Review      Assessment/Plan:     1. Bipolar affective disorder, current episode mixed, current episode severity unspecified (HCC)  - ARIPiprazole (ABILIFY) 5 MG tablet; Take 1 tablet by mouth daily  Dispense: 30 tablet; Refill: 3  - divalproex (DEPAKOTE ER) 250 MG extended release tablet; Take 2 tablets by mouth daily  Dispense: 60 tablet; Refill: 3  - mirtazapine (REMERON) 30 MG tablet; Take 1 tablet by mouth nightly  Dispense: 30 tablet; Refill: 5    2. Chronic obstructive pulmonary disease with acute exacerbation ([facility])  - tiotropium (SPIRIVA HANDIHALER) 18 MCG inhalation capsule; Inhale 1 capsule into the lungs daily  Dispense: 90 capsule; Refill: 1  - albuterol sulfate HFA (VENTOLIN HFA) 108 (90 Base) MCG/ACT inhaler; Inhale 2 puffs into the lungs every 4 hours as needed for Wheezing  Dispense: 1 Inhaler; Refill: 3    3. Thrombophilia ([facility])  - rivaroxaban (XARELTO) 20 MG TABS tablet; Take 1 tablet by mouth daily (with breakfast)  Dispense: 90 tablet; Refill: 1    4. Factor V Leiden mutation ([facility]  - rivaroxaban (XARELTO) 20 MG TABS tablet; Take 1 tablet by mouth daily (with breakfast)  Dispense: 90 tablet; Refill: 1    5. STD exposure  - HIV Screen; Future  - Hepatitis Panel, Acute; Future    6. Elevated liver enzymes  - Hepatic Function Panel;  Future          Electronically signed by Leandra Obregon MD on 1/13/2020 at 12:03 PM

## 2020-09-30 ENCOUNTER — HOSPITAL ENCOUNTER (OUTPATIENT)
Age: 35
Setting detail: SPECIMEN
Discharge: HOME | End: 2020-09-30
Payer: MEDICARE

## 2020-09-30 ENCOUNTER — OFFICE VISIT (OUTPATIENT)
Dept: FAMILY MEDICINE CLINIC | Age: 35
End: 2020-09-30
Payer: MEDICARE

## 2020-09-30 VITALS
TEMPERATURE: 97.4 F | DIASTOLIC BLOOD PRESSURE: 82 MMHG | SYSTOLIC BLOOD PRESSURE: 122 MMHG | HEART RATE: 104 BPM | WEIGHT: 229.6 LBS | OXYGEN SATURATION: 94 % | HEIGHT: 66 IN | BODY MASS INDEX: 36.9 KG/M2

## 2020-09-30 PROBLEM — J44.9 CHRONIC OBSTRUCTIVE PULMONARY DISEASE (HCC): Status: ACTIVE | Noted: 2020-09-30

## 2020-09-30 PROBLEM — R42 DIZZINESS: Status: ACTIVE | Noted: 2020-09-30

## 2020-09-30 PROBLEM — E78.00 PURE HYPERCHOLESTEROLEMIA: Status: ACTIVE | Noted: 2020-09-30

## 2020-09-30 PROBLEM — R51.9 NONINTRACTABLE EPISODIC HEADACHE: Status: ACTIVE | Noted: 2020-09-30

## 2020-09-30 LAB
ABSOLUTE EOS #: 0.26 K/UL (ref 0–0.44)
ABSOLUTE IMMATURE GRANULOCYTE: <0.03 K/UL (ref 0–0.3)
ABSOLUTE LYMPH #: 2.45 K/UL (ref 1.1–3.7)
ABSOLUTE MONO #: 0.5 K/UL (ref 0.1–1.2)
ALBUMIN SERPL-MCNC: 4.1 G/DL (ref 3.5–5.2)
ALBUMIN/GLOBULIN RATIO: 1.6 (ref 1–2.5)
ALP BLD-CCNC: 85 U/L (ref 35–104)
ALT SERPL-CCNC: 30 U/L (ref 5–33)
ANION GAP SERPL CALCULATED.3IONS-SCNC: 13 MMOL/L (ref 9–17)
AST SERPL-CCNC: 25 U/L
BASOPHILS # BLD: 1 % (ref 0–2)
BASOPHILS ABSOLUTE: 0.04 K/UL (ref 0–0.2)
BILIRUB SERPL-MCNC: 0.36 MG/DL (ref 0.3–1.2)
BUN BLDV-MCNC: 6 MG/DL (ref 6–20)
BUN/CREAT BLD: ABNORMAL (ref 9–20)
CALCIUM SERPL-MCNC: 9.6 MG/DL (ref 8.6–10.4)
CHLORIDE BLD-SCNC: 105 MMOL/L (ref 98–107)
CHOLESTEROL, FASTING: 216 MG/DL
CHOLESTEROL/HDL RATIO: 4.5
CO2: 23 MMOL/L (ref 20–31)
CREAT SERPL-MCNC: 0.73 MG/DL (ref 0.5–0.9)
DIFFERENTIAL TYPE: ABNORMAL
EOSINOPHILS RELATIVE PERCENT: 3 % (ref 1–4)
GFR AFRICAN AMERICAN: >60 ML/MIN
GFR NON-AFRICAN AMERICAN: >60 ML/MIN
GFR SERPL CREATININE-BSD FRML MDRD: ABNORMAL ML/MIN/{1.73_M2}
GFR SERPL CREATININE-BSD FRML MDRD: ABNORMAL ML/MIN/{1.73_M2}
GLUCOSE BLD-MCNC: 104 MG/DL (ref 70–99)
HCT VFR BLD CALC: 49.5 % (ref 36.3–47.1)
HDLC SERPL-MCNC: 48 MG/DL
HEMOGLOBIN: 16.2 G/DL (ref 11.9–15.1)
IMMATURE GRANULOCYTES: 0 %
LDL CHOLESTEROL: 140 MG/DL (ref 0–130)
LYMPHOCYTES # BLD: 32 % (ref 24–43)
MCH RBC QN AUTO: 30.5 PG (ref 25.2–33.5)
MCHC RBC AUTO-ENTMCNC: 32.7 G/DL (ref 28.4–34.8)
MCV RBC AUTO: 93.2 FL (ref 82.6–102.9)
MONOCYTES # BLD: 7 % (ref 3–12)
NRBC AUTOMATED: 0 PER 100 WBC
PDW BLD-RTO: 12.8 % (ref 11.8–14.4)
PLATELET # BLD: 329 K/UL (ref 138–453)
PLATELET ESTIMATE: ABNORMAL
PMV BLD AUTO: 9.8 FL (ref 8.1–13.5)
POTASSIUM SERPL-SCNC: 4.4 MMOL/L (ref 3.7–5.3)
RBC # BLD: 5.31 M/UL (ref 3.95–5.11)
RBC # BLD: ABNORMAL 10*6/UL
SEG NEUTROPHILS: 57 % (ref 36–65)
SEGMENTED NEUTROPHILS ABSOLUTE COUNT: 4.34 K/UL (ref 1.5–8.1)
SODIUM BLD-SCNC: 141 MMOL/L (ref 135–144)
TOTAL PROTEIN: 6.7 G/DL (ref 6.4–8.3)
TRIGLYCERIDE, FASTING: 138 MG/DL
TSH SERPL DL<=0.05 MIU/L-ACNC: 2.8 MIU/L (ref 0.3–5)
VLDLC SERPL CALC-MCNC: ABNORMAL MG/DL (ref 1–30)
WBC # BLD: 7.6 K/UL (ref 3.5–11.3)
WBC # BLD: ABNORMAL 10*3/UL

## 2020-09-30 PROCEDURE — G8427 DOCREV CUR MEDS BY ELIG CLIN: HCPCS | Performed by: INTERNAL MEDICINE

## 2020-09-30 PROCEDURE — G8417 CALC BMI ABV UP PARAM F/U: HCPCS | Performed by: INTERNAL MEDICINE

## 2020-09-30 PROCEDURE — 99214 OFFICE O/P EST MOD 30 MIN: CPT | Performed by: INTERNAL MEDICINE

## 2020-09-30 PROCEDURE — 3023F SPIROM DOC REV: CPT | Performed by: INTERNAL MEDICINE

## 2020-09-30 PROCEDURE — G8926 SPIRO NO PERF OR DOC: HCPCS | Performed by: INTERNAL MEDICINE

## 2020-09-30 PROCEDURE — 4004F PT TOBACCO SCREEN RCVD TLK: CPT | Performed by: INTERNAL MEDICINE

## 2020-09-30 RX ORDER — AMOXICILLIN 875 MG/1
875 TABLET, COATED ORAL 2 TIMES DAILY
Qty: 14 TABLET | Refills: 0 | Status: SHIPPED | OUTPATIENT
Start: 2020-09-30 | End: 2020-10-07

## 2020-09-30 ASSESSMENT — ENCOUNTER SYMPTOMS
COUGH: 0
RHINORRHEA: 0
NAUSEA: 0
VOMITING: 0
SCALP TENDERNESS: 0
EYE REDNESS: 0
SWOLLEN GLANDS: 0
VISUAL CHANGE: 0
EYE WATERING: 0
ABDOMINAL PAIN: 0
SINUS PRESSURE: 0
BLURRED VISION: 0
EYE PAIN: 0
FACIAL SWEATING: 0
SORE THROAT: 0
BACK PAIN: 0
CHANGE IN BOWEL HABIT: 0
PHOTOPHOBIA: 0

## 2020-09-30 NOTE — PROGRESS NOTES
Subjective:       Patient ID:     Boris Roberts is a 38 y.o. female who presents for   Chief Complaint   Patient presents with    Dizziness    Headache       HPI:  Nursing note reviewed and discussed with patient. Dizziness   This is a recurrent problem. The current episode started more than 1 month ago. The problem occurs intermittently. The problem has been gradually worsening. Associated symptoms include headaches. Pertinent negatives include no abdominal pain, anorexia, arthralgias, change in bowel habit, chest pain, chills, congestion, coughing, diaphoresis, fatigue, fever, joint swelling, myalgias, nausea, neck pain, numbness, rash, sore throat, swollen glands, urinary symptoms, vertigo, visual change, vomiting or weakness. Nothing (usually occurs after she has been standing for a while ) aggravates the symptoms. Headache    This is a new problem. The current episode started 1 to 4 weeks ago. The problem occurs daily. The problem has been waxing and waning. The pain is located in the frontal and temporal region. The pain does not radiate. The quality of the pain is described as sharp and throbbing. The pain is moderate. Associated symptoms include dizziness. Pertinent negatives include no abdominal pain, abnormal behavior, anorexia, back pain, blurred vision, coughing, drainage, ear pain, eye pain, eye redness, eye watering, facial sweating, fever, hearing loss, insomnia, loss of balance, muscle aches, nausea, neck pain, numbness, phonophobia, photophobia, rhinorrhea, scalp tenderness, seizures, sinus pressure, sore throat, swollen glands, tingling, tinnitus, visual change, vomiting, weakness or weight loss. Nothing aggravates the symptoms. She has tried acetaminophen and NSAIDs for the symptoms. The treatment provided no relief. Patient's medications, allergies, past medical, surgical, social and family histories were reviewed and updated as appropriate.     Past Medical History:   Diagnosis Date    Depression     Overactive bladder     Seizures ([facility]     Trauma      Past Surgical History:   Procedure Laterality Date    ABLATION OF DYSRHYTHMIC FOCUS  2013    CHOLECYSTECTOMY  02/03/2011    IVC FILTER INSERTION      TUBAL LIGATION  09/09/2009       Social History     Tobacco Use    Smoking status: Current Every Day Smoker     Packs/day: 0.05     Years: 25.00     Pack years: 1.25     Types: Cigarettes     Start date: 10/24/1993    Smokeless tobacco: Never Used   Substance Use Topics    Alcohol use: Yes     Comment: occasional       Patient Active Problem List   Diagnosis    Bipolar affective disorder, current episode mixed ([facility]    Factor V Leiden mutation ([facility]    Thrombophilia ([place] [address].)         Prior to Visit Medications    Medication Sig Taking? Authorizing Provider   ARIPiprazole (ABILIFY) 5 MG tablet Take 1 tablet by mouth daily Yes Rylee Galarza MD   divalproex (DEPAKOTE ER) 250 MG extended release tablet Take 2 tablets by mouth daily Yes Rylee Galarza MD   mirtazapine (REMERON) 30 MG tablet Take 1 tablet by mouth nightly Yes Rylee Galarza MD   tiotropium (Barbara Corrina) 18 MCG inhalation capsule Inhale 1 capsule into the lungs daily Yes Rylee Galarza MD   rivaroxaban (XARELTO) 20 MG TABS tablet Take 1 tablet by mouth daily (with breakfast) Yes Juliana Dooley MD   albuterol sulfate  (90 Base) MCG/ACT inhaler inhale 2 puffs by mouth every 4 hours if needed for wheezing Yes Juliana Dooley MD   Humidifiers (COOL MIST HUMIDIFIER) MISC 1 each by Does not apply route daily Yes Rylee Galarza MD   nicotine (NICODERM CQ) 21 MG/24HR Place 1 patch onto the skin every 24 hours  Juliana Dooley MD     Review of Systems  Review of Systems   Constitutional: Negative for chills, diaphoresis, fatigue, fever and weight loss. HENT: Negative for congestion, ear pain, hearing loss, rhinorrhea, sinus pressure, sore throat and tinnitus.     Eyes: Negative for blurred vision, photophobia, pain and redness. Respiratory: Negative for cough. Cardiovascular: Positive for palpitations (with dizziness). Negative for chest pain. Gastrointestinal: Negative for abdominal pain, anorexia, change in bowel habit, nausea and vomiting. Musculoskeletal: Negative for arthralgias, back pain, joint swelling, myalgias and neck pain. Skin: Negative for rash. Neurological: Positive for dizziness and headaches. Negative for vertigo, tingling, seizures, weakness, numbness and loss of balance. Psychiatric/Behavioral: The patient does not have insomnia. Objective:       Physical Exam:  /82 (Site: Left Upper Arm, Position: Sitting, Cuff Size: Large Adult)   Pulse 104   Temp 97.4 °F (36.3 °C) (Temporal)   Ht 5' 5.6\" (1.666 m)   Wt 229 lb 9.6 oz (104.1 kg)   LMP 08/30/2020   SpO2 94%   BMI 37.51 kg/m²   Physical Exam  Vitals signs and nursing note reviewed. Constitutional:       Appearance: She is well-developed. HENT:      Right Ear: Hearing and external ear normal.      Left Ear: Hearing and external ear normal.      Nose: Rhinorrhea present. Rhinorrhea is purulent. Right Turbinates: Swollen and pale. Left Turbinates: Swollen and pale. Right Sinus: No maxillary sinus tenderness or frontal sinus tenderness. Left Sinus: No maxillary sinus tenderness or frontal sinus tenderness. Mouth/Throat:      Pharynx: Uvula midline. Cardiovascular:      Rate and Rhythm: Normal rate and regular rhythm. Heart sounds: Normal heart sounds. No murmur. No friction rub. No gallop. Pulmonary:      Effort: Pulmonary effort is normal. No respiratory distress. Breath sounds: Normal breath sounds. No wheezing. Abdominal:      General: Bowel sounds are normal.      Palpations: Abdomen is soft. There is no mass. Tenderness: There is no abdominal tenderness. There is no guarding. Musculoskeletal: Normal range of motion.    Lymphadenopathy: Cervical: No cervical adenopathy. Neurological:      Mental Status: She is alert. Data Review  not applicable       Assessment/Plan:      1. Dizziness  - Comprehensive Metabolic Panel; Future  - CBC With Auto Differential; Future    2. Nonintractable episodic headache, unspecified headache type  Continue current management     3. Current use of long term anticoagulation  Continues to use NSAIDs, aware of risk of bleeding with NSAID use during Xarelto use     4. Pure hypercholesterolemia  - Lipid, Fasting; Future    5. Screening for thyroid disorder   TSH With Reflex Ft4; Future    6. Acute bacterial sinusitis  - amoxicillin (AMOXIL) 875 MG tablet; Take 1 tablet by mouth 2 times daily for 7 days  Dispense: 14 tablet; Refill: 0    7. Bipolar affective disorder, current episode mixed, current episode severity unspecified ([facility]  Continue ability, remeron     8. Thrombophilia ([facility]  Continue Xarelto     9. Factor V Leiden mutation ([facility]  Continue Xarelto     10.  Chronic obstructive pulmonary disease, unspecified COPD type ([facility]  Continue albuterol PRN and daily spiriva   Smoking cessation advised            Health Maintenance Due   Topic Date Due    Varicella vaccine (1 of 2 - 2-dose childhood series) 02/17/1984    Pneumococcal 0-64 years Vaccine (1 of 1 - PPSV23) 02/17/1989    DTaP/Tdap/Td vaccine (1 - Tdap) 02/17/2002       Electronically signed by Anabela Jones MD on 9/30/2020 at 10:52 AM

## 2020-11-09 RX ORDER — ALBUTEROL SULFATE 90 UG/1
AEROSOL, METERED RESPIRATORY (INHALATION)
Qty: 18 G | Refills: 3 | Status: SHIPPED | OUTPATIENT
Start: 2020-11-09 | End: 2021-07-23 | Stop reason: SDUPTHER

## 2020-11-09 NOTE — TELEPHONE ENCOUNTER
Last visit: 09/30/2020  Last Med refill: 08/27/2020  Does patient have enough medication for 72 hours: No    Next Visit Date:  Future Appointments   Date Time Provider Ed Cavanaugh   3/30/2021  2:00 PM Juliana Treadwell  [address] Maintenance   Topic Date Due    Varicella vaccine (1 of 2 - 2-dose childhood series) 02/17/1984    Pneumococcal 0-64 years Vaccine (1 of 1 - PPSV23) 02/17/1989    DTaP/Tdap/Td vaccine (1 - Tdap) 02/17/2002    Flu vaccine (1) 09/30/2021 (Originally 9/1/2020)    Cervical cancer screen  01/24/2024    HIV screen  Completed    Hepatitis A vaccine  Aged Out    Hepatitis B vaccine  Aged Out    Hib vaccine  Aged Out    Meningococcal (ACWY) vaccine  Aged Out       Hemoglobin A1C (%)   Date Value   02/22/2019 4.9             ( goal A1C is < 7)   No results found for: LABMICR  LDL Cholesterol (mg/dL)   Date Value   09/30/2020 140 (H)   02/22/2019 128       (goal LDL is <100)   AST (U/L)   Date Value   09/30/2020 25     ALT (U/L)   Date Value   09/30/2020 30     BUN (mg/dL)   Date Value   09/30/2020 6     BP Readings from Last 3 Encounters:   09/30/20 122/82   01/13/20 122/78   06/19/19 (!) 155/84          (goal 120/80)    All Future Testing planned in [facility]              Patient Active Problem List:     Bipolar affective disorder, current episode mixed ([facility]     Factor V Leiden mutation ([facility]     Thrombophilia ([facility]     Chronic obstructive pulmonary disease ([facility]     Pure hypercholesterolemia     Nonintractable episodic headache     Dizziness

## 2020-11-12 NOTE — TELEPHONE ENCOUNTER
Last visit: 09/30/2020  Last Med refill: 01/15/16486  Does patient have enough medication for 72 hours: No:   BY FILL DATE PATIENT MIGHT NOT BE TAKING AS PRESCRIBED    Next Visit Date:  Future Appointments   Date Time Provider Ed Cavanaugh   3/30/2021  2:00 PM Juliana Paniagua  [address] Maintenance   Topic Date Due    Varicella vaccine (1 of 2 - 2-dose childhood series) 02/17/1984    Pneumococcal 0-64 years Vaccine (1 of 1 - PPSV23) 02/17/1989    DTaP/Tdap/Td vaccine (1 - Tdap) 02/17/2002    Flu vaccine (1) 09/30/2021 (Originally 9/1/2020)    Cervical cancer screen  01/24/2024    HIV screen  Completed    Hepatitis A vaccine  Aged Out    Hepatitis B vaccine  Aged Out    Hib vaccine  Aged Out    Meningococcal (ACWY) vaccine  Aged Out       Hemoglobin A1C (%)   Date Value   02/22/2019 4.9             ( goal A1C is < 7)   No results found for: LABMICR  LDL Cholesterol (mg/dL)   Date Value   09/30/2020 140 (H)   02/22/2019 128       (goal LDL is <100)   AST (U/L)   Date Value   09/30/2020 25     ALT (U/L)   Date Value   09/30/2020 30     BUN (mg/dL)   Date Value   09/30/2020 6     BP Readings from Last 3 Encounters:   09/30/20 122/82   01/13/20 122/78   06/19/19 (!) 155/84          (goal 120/80)    All Future Testing planned in [facility]              Patient Active Problem List:     Bipolar affective disorder, current episode mixed ([facility]     Factor V Leiden mutation ([facility]     Thrombophilia ([facility]     Chronic obstructive pulmonary disease ([facility]     Pure hypercholesterolemia     Nonintractable episodic headache     Dizziness

## 2020-11-13 RX ORDER — RIVAROXABAN 20 MG/1
TABLET, FILM COATED ORAL
Qty: 90 TABLET | Refills: 1 | Status: SHIPPED | OUTPATIENT
Start: 2020-11-13 | End: 2021-07-23 | Stop reason: SDUPTHER

## 2021-01-12 ENCOUNTER — NURSE TRIAGE (OUTPATIENT)
Dept: OTHER | Facility: CLINIC | Age: 36
End: 2021-01-12

## 2021-01-12 ENCOUNTER — OFFICE VISIT (OUTPATIENT)
Dept: FAMILY MEDICINE CLINIC | Age: 36
End: 2021-01-12
Payer: MEDICARE

## 2021-01-12 VITALS
SYSTOLIC BLOOD PRESSURE: 118 MMHG | DIASTOLIC BLOOD PRESSURE: 80 MMHG | OXYGEN SATURATION: 98 % | WEIGHT: 227.2 LBS | BODY MASS INDEX: 37.12 KG/M2 | HEART RATE: 84 BPM | TEMPERATURE: 97.2 F

## 2021-01-12 DIAGNOSIS — Z72.0 TOBACCO ABUSE: ICD-10-CM

## 2021-01-12 DIAGNOSIS — N64.4 BREAST PAIN, LEFT: Primary | ICD-10-CM

## 2021-01-12 PROCEDURE — 4004F PT TOBACCO SCREEN RCVD TLK: CPT | Performed by: INTERNAL MEDICINE

## 2021-01-12 PROCEDURE — G8427 DOCREV CUR MEDS BY ELIG CLIN: HCPCS | Performed by: INTERNAL MEDICINE

## 2021-01-12 PROCEDURE — G8417 CALC BMI ABV UP PARAM F/U: HCPCS | Performed by: INTERNAL MEDICINE

## 2021-01-12 PROCEDURE — 99214 OFFICE O/P EST MOD 30 MIN: CPT | Performed by: INTERNAL MEDICINE

## 2021-01-12 PROCEDURE — G8484 FLU IMMUNIZE NO ADMIN: HCPCS | Performed by: INTERNAL MEDICINE

## 2021-01-12 RX ORDER — NICOTINE 21 MG/24HR
1 PATCH, TRANSDERMAL 24 HOURS TRANSDERMAL EVERY 24 HOURS
Qty: 30 PATCH | Refills: 3 | Status: SHIPPED | OUTPATIENT
Start: 2021-01-12 | End: 2021-07-23

## 2021-01-12 SDOH — ECONOMIC STABILITY: TRANSPORTATION INSECURITY
IN THE PAST 12 MONTHS, HAS LACK OF TRANSPORTATION KEPT YOU FROM MEETINGS, WORK, OR FROM GETTING THINGS NEEDED FOR DAILY LIVING?: NO

## 2021-01-12 SDOH — ECONOMIC STABILITY: FOOD INSECURITY: WITHIN THE PAST 12 MONTHS, THE FOOD YOU BOUGHT JUST DIDN'T LAST AND YOU DIDN'T HAVE MONEY TO GET MORE.: SOMETIMES TRUE

## 2021-01-12 SDOH — ECONOMIC STABILITY: FOOD INSECURITY: WITHIN THE PAST 12 MONTHS, YOU WORRIED THAT YOUR FOOD WOULD RUN OUT BEFORE YOU GOT MONEY TO BUY MORE.: SOMETIMES TRUE

## 2021-01-12 SDOH — ECONOMIC STABILITY: TRANSPORTATION INSECURITY
IN THE PAST 12 MONTHS, HAS THE LACK OF TRANSPORTATION KEPT YOU FROM MEDICAL APPOINTMENTS OR FROM GETTING MEDICATIONS?: NO

## 2021-01-12 ASSESSMENT — ENCOUNTER SYMPTOMS
SHORTNESS OF BREATH: 0
VOMITING: 0
CHOKING: 0
ABDOMINAL PAIN: 0
CHEST TIGHTNESS: 0
ANAL BLEEDING: 0
CONSTIPATION: 0
NAUSEA: 0
COUGH: 0
DIARRHEA: 0
WHEEZING: 0
BLOOD IN STOOL: 0

## 2021-01-12 ASSESSMENT — VISUAL ACUITY: OU: 1

## 2021-01-12 NOTE — TELEPHONE ENCOUNTER
Patient reports about 3 days ago she had a really sharp pain in the left side of her left breast. Patient reports now today that the pain is now steady and is staying constant. Patient reports that she is having to hold her left breast to help with the pain. Patient denies any lumps but states tender to the touch. Patient denies any sores or wounds to the area. Pain is a 7/10. Reason for Disposition   Patient wants to be seen    Answer Assessment - Initial Assessment Questions  1. SYMPTOM: \"What's the main symptom you're concerned about? \"  (e.g., lump, pain, rash, nipple discharge)      Pain in the left side of left breast.    2. LOCATION: \"Where is the above located? \"      See above. 3. ONSET: \"When did pain  start? \"      3 days ago. 4. PRIOR HISTORY: \"Do you have any history of prior problems with your breasts? \" (e.g., lumps, cancer, fibrocystic breast disease)      Denies    5. CAUSE: \"What do you think is causing this symptom? \"      Unsure    6. OTHER SYMPTOMS: \"Do you have any other symptoms? \" (e.g., fever, breast pain, redness or rash, nipple discharge)      Denies    7. PREGNANCY-BREASTFEEDING: \"Is there any chance you are pregnant? \" \"When was your last menstrual period? \" \"Are you breastfeeding? \"      Denies    Protocols used: [facility]    Patient called pre-service center [facility]) to schedule appointment, with red flag complaint, transferred to RN access for triage. See above questions and answers. Caller talking full sentences without any distress on phone. Discussed disposition and patient agreeable. Discussed potential consequences for not following disposition recommendation. Aware to call back with any concerns or persistent, worsening, or new symptoms develop. Warm transfer to [facility] scheduling for appointment. Attention Provider: Thank you for allowing me to participate in the care of your patient.  The  patient was connected to triage in response to information provided to the [facility]. Please do not respond through this encounter as the response is not directed to a shared pool.

## 2021-01-12 NOTE — PROGRESS NOTES
Subjective:       Patient ID:     Layla Garcia is a 38 y.o. female who presents for   Chief Complaint   Patient presents with    Breast Pain     LT side       HPI:  Nursing note reviewed and discussed with patient. Left breast pain - started three days ago, woke up with the pain. Left upper outer quadrant of breast, worse with lifting and reaching. Different from usual monthly tenderness around menses. Juan Pablo Caves are three months old, she got ones with the rylie cross strap. Patient's medications, allergies, past medical, surgical, social and family histories were reviewed and updated as appropriate. Past Medical History:   Diagnosis Date    Depression     Overactive bladder     Seizures ([facility]     Trauma      Past Surgical History:   Procedure Laterality Date    ABLATION OF DYSRHYTHMIC FOCUS  2013    CHOLECYSTECTOMY  02/03/2011    IVC FILTER INSERTION      TUBAL LIGATION  09/09/2009       Social History     Tobacco Use    Smoking status: Current Every Day Smoker     Packs/day: 0.05     Years: 25.00     Pack years: 1.25     Types: Cigarettes     Start date: 10/24/1993    Smokeless tobacco: Never Used   Substance Use Topics    Alcohol use: Yes     Comment: occasional       Patient Active Problem List   Diagnosis    Bipolar affective disorder, current episode mixed ([facility]    [facility]    Thrombophilia ([facility]    Chronic obstructive pulmonary disease ([facility]    Pure hypercholesterolemia    Nonintractable episodic headache    Dizziness         Prior to Visit Medications    Medication Sig Taking?  Authorizing Provider   XARELTO 20 MG TABS tablet take 1 tablet by mouth once daily WITH BREAKFAST Yes Juliana Rios MD   albuterol sulfate  (90 Base) MCG/ACT inhaler inhale 2 puffs by mouth every 4 hours if needed for wheezing Yes Juliana Rios MD   ARIPiprazole (ABILIFY) 5 MG tablet Take 1 tablet by mouth daily Yes Yoel Hurd MD   divalproex (DEPAKOTE ER) 250 MG extended release tablet Take 2 tablets by mouth daily Yes Juliana Mae MD   mirtazapine (REMERON) 30 MG tablet Take 1 tablet by mouth nightly Yes Juliana Mae MD   tiotropium (Tara Stapler) 18 MCG inhalation capsule Inhale 1 capsule into the lungs daily Yes Rachel Barnett MD   Humidifiers (COOL MIST HUMIDIFIER) [address] [facility] 1 each by Does not apply route daily Yes Rachel Barnett MD   nicotine (NICODERM CQ) 21 MG/24HR Place 1 patch onto the skin every 24 hours  Patient not taking: Reported on 1/12/2021  Rachel Barnett MD     Review of Systems  Review of Systems   Constitutional: Negative for fatigue, fever and unexpected weight change. Respiratory: Negative for cough, choking, chest tightness, shortness of breath and wheezing. Cardiovascular: Negative for chest pain, palpitations and leg swelling. Gastrointestinal: Negative for abdominal pain, anal bleeding, blood in stool, constipation, diarrhea, nausea and vomiting. Endocrine: Negative. Musculoskeletal: Negative for joint swelling and myalgias. Skin: Negative. Neurological: Negative for dizziness. Psychiatric/Behavioral: Negative for sleep disturbance. All other systems reviewed and are negative. Objective:       Physical Exam:  /80 (Site: Left Upper Arm, Position: Sitting, Cuff Size: Large Adult)   Pulse 84   Temp 97.2 °F (36.2 °C) (Temporal)   Wt 227 lb 3.2 oz (103.1 kg)   SpO2 98%   BMI 37.12 kg/m²   Physical Exam  Vitals signs and nursing note reviewed. Constitutional:       General: She is not in acute distress. Appearance: She is well-developed. She is not ill-appearing. Eyes:      General: Lids are normal. Vision grossly intact. Cardiovascular:      Rate and Rhythm: Normal rate and regular rhythm. Heart sounds: Normal heart sounds, S1 normal and S2 normal. No murmur. No friction rub. No gallop. Pulmonary:      Effort: Pulmonary effort is normal. No respiratory distress.       Breath

## 2021-01-12 NOTE — PATIENT INSTRUCTIONS
Patient Education        Stopping Smoking: Care Instructions  Your Care Instructions     Cigarette smokers crave the nicotine in cigarettes. Giving it up is much harder than simply changing a habit. Your body has to stop craving the nicotine. It is hard to quit, but you can do it. There are many tools that people use to quit smoking. You may find that combining tools works best for you. There are several steps to quitting. First you get ready to quit. Then you get support to help you. After that, you learn new skills and behaviors to become a nonsmoker. For many people, a necessary step is getting and using medicine. Your doctor will help you set up the plan that best meets your needs. You may want to attend a smoking cessation program to help you quit smoking. When you choose a program, look for one that has proven success. Ask your doctor for ideas. You will greatly increase your chances of success if you take medicine as well as get counseling or join a cessation program.  Some of the changes you feel when you first quit tobacco are uncomfortable. Your body will miss the nicotine at first, and you may feel short-tempered and grumpy. You may have trouble sleeping or concentrating. Medicine can help you deal with these symptoms. You may struggle with changing your smoking habits and rituals. The last step is the tricky one: Be prepared for the smoking urge to continue for a time. This is a lot to deal with, but keep at it. You will feel better. Follow-up care is a key part of your treatment and safety. Be sure to make and go to all appointments, and call your doctor if you are having problems. It's also a good idea to know your test results and keep a list of the medicines you take. How can you care for yourself at home? · Ask your family, friends, and coworkers for support. You have a better chance of quitting if you have help and support.   · Join a support group, such as Nicotine Anonymous, for people who are trying to quit smoking. · Consider signing up for a smoking cessation program, such as the American Lung Association's Freedom from Smoking program.  · Get text messaging support. Go to the website at www.smokefree. gov to sign up for the [facility] program.  · Set a quit date. Pick your date carefully so that it is not right in the middle of a big deadline or stressful time. Once you quit, do not even take a puff. Get rid of all ashtrays and lighters after your last cigarette. Clean your house and your clothes so that they do not smell of smoke. · Learn how to be a nonsmoker. Think about ways you can avoid those things that make you reach for a cigarette. ? Avoid situations that put you at greatest risk for smoking. For some people, it is hard to have a drink with friends without smoking. For others, they might skip a coffee break with coworkers who smoke. ? Change your daily routine. Take a different route to work or eat a meal in a different place. · Cut down on stress. Calm yourself or release tension by doing an activity you enjoy, such as reading a book, taking a hot bath, or gardening. · Talk to your doctor or pharmacist about nicotine replacement therapy, which replaces the nicotine in your body. You still get nicotine but you do not use tobacco. Nicotine replacement products help you slowly reduce the amount of nicotine you need. These products come in several forms, many of them available over-the-counter:  ? Nicotine patches  ? Nicotine gum and lozenges  ? Nicotine inhaler  · Ask your doctor about bupropion (Wellbutrin) or varenicline (Chantix), which are prescription medicines. They do not contain nicotine. They help you by reducing withdrawal symptoms, such as stress and anxiety. · Some people find hypnosis, acupuncture, and massage helpful for ending the smoking habit. · Eat a healthy diet and get regular exercise.  Having healthy habits will help your body move past its craving for nicotine. · Be prepared to keep trying. Most people are not successful the first few times they try to quit. Do not get mad at yourself if you smoke again. Make a list of things you learned and think about when you want to try again, such as next week, next month, or next year. Where can you learn more? Go to https://[employer] Expert. org and sign in to your Photos [employer] account. Enter L625 in the [employer] box to learn more about \"Stopping Smoking: Care Instructions. \"     If you do not have an account, please click on the \"Sign Up Now\" link. Current as of: March 12, 2020               Content Version: 12.6  © 2006-2020 [employer]. Care instructions adapted under license by [facility] ([facility]). If you have questions about a medical condition or this instruction, always ask your healthcare professional. [address] any warranty or liability for your use of this information.

## 2021-01-12 NOTE — PROGRESS NOTES
Pt states LT breast started hurting since 1/09/2021.  Feels like a needle being jabbed into her breast

## 2021-01-18 DIAGNOSIS — N64.4 BREAST PAIN, LEFT: ICD-10-CM

## 2021-03-31 ENCOUNTER — TELEPHONE (OUTPATIENT)
Dept: FAMILY MEDICINE CLINIC | Age: 36
End: 2021-03-31

## 2021-07-19 ENCOUNTER — TELEPHONE (OUTPATIENT)
Dept: FAMILY MEDICINE CLINIC | Age: 36
End: 2021-07-19

## 2021-07-19 ENCOUNTER — NURSE TRIAGE (OUTPATIENT)
Dept: OTHER | Facility: CLINIC | Age: 36
End: 2021-07-19
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
## 2021-07-19 NOTE — TELEPHONE ENCOUNTER
----- Message from [place] sent at 7/19/2021  2:51 PM EDT -----  Subject: Message to Provider    QUESTIONS  Information for Provider? Received return from nurse triage transfer. Pt   to be seen today for knee pain - screened red (cough). No availability   office wide. No answer at backline. Advised pt to go to ED or urgent care   to be assessed per nurse triage. ---------------------------------------------------------------------------  --------------  Ciro HOPPER  What is the best way for the office to contact you? OK to leave message on   voicemail  Preferred Call Back Phone Number? [phone]  ---------------------------------------------------------------------------  --------------  SCRIPT ANSWERS  Relationship to Patient?  Self

## 2021-07-21 ENCOUNTER — NURSE TRIAGE (OUTPATIENT)
Dept: OTHER | Facility: CLINIC | Age: 36
End: 2021-07-21
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
## 2021-07-23 ENCOUNTER — OFFICE VISIT (OUTPATIENT)
Dept: FAMILY MEDICINE CLINIC | Age: 36
End: 2021-07-23
Payer: MEDICARE

## 2021-07-23 VITALS
BODY MASS INDEX: 35.94 KG/M2 | SYSTOLIC BLOOD PRESSURE: 104 MMHG | DIASTOLIC BLOOD PRESSURE: 70 MMHG | WEIGHT: 220 LBS | HEART RATE: 90 BPM | OXYGEN SATURATION: 100 %

## 2021-07-23 DIAGNOSIS — D68.51 FACTOR V LEIDEN MUTATION (HCC): ICD-10-CM
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
## 2021-07-23 DIAGNOSIS — F31.60 BIPOLAR AFFECTIVE DISORDER, CURRENT EPISODE MIXED, CURRENT EPISODE SEVERITY UNSPECIFIED (HCC): ICD-10-CM

## 2021-07-23 DIAGNOSIS — J44.1 CHRONIC OBSTRUCTIVE PULMONARY DISEASE WITH ACUTE EXACERBATION (HCC): ICD-10-CM

## 2021-07-23 DIAGNOSIS — D68.59 THROMBOPHILIA (HCC): ICD-10-CM

## 2021-07-23 PROCEDURE — G8926 SPIRO NO PERF OR DOC: HCPCS | Performed by: NURSE PRACTITIONER

## 2021-07-23 PROCEDURE — 99214 OFFICE O/P EST MOD 30 MIN: CPT | Performed by: NURSE PRACTITIONER

## 2021-07-23 PROCEDURE — 3023F SPIROM DOC REV: CPT | Performed by: NURSE PRACTITIONER

## 2021-07-23 PROCEDURE — G8427 DOCREV CUR MEDS BY ELIG CLIN: HCPCS | Performed by: NURSE PRACTITIONER

## 2021-07-23 PROCEDURE — G8417 CALC BMI ABV UP PARAM F/U: HCPCS | Performed by: NURSE PRACTITIONER

## 2021-07-23 PROCEDURE — 4004F PT TOBACCO SCREEN RCVD TLK: CPT | Performed by: NURSE PRACTITIONER

## 2021-07-23 RX ORDER — TIOTROPIUM BROMIDE 18 UG/1
18 CAPSULE ORAL; RESPIRATORY (INHALATION) DAILY
Qty: 90 CAPSULE | Refills: 1 | Status: SHIPPED | OUTPATIENT
Start: 2021-07-23 | End: 2022-05-17 | Stop reason: SDUPTHER

## 2021-07-23 RX ORDER — DIVALPROEX SODIUM 250 MG/1
500 TABLET, EXTENDED RELEASE ORAL DAILY
Qty: 180 TABLET | Refills: 1 | Status: SHIPPED | OUTPATIENT
Start: 2021-07-23 | End: 2022-05-17 | Stop reason: SDUPTHER

## 2021-07-23 RX ORDER — PREDNISONE 20 MG/1
20 TABLET ORAL DAILY
Qty: 7 TABLET | Refills: 0 | Status: SHIPPED | OUTPATIENT
Start: 2021-07-23 | End: 2021-07-30

## 2021-07-23 RX ORDER — ALBUTEROL SULFATE 90 UG/1
AEROSOL, METERED RESPIRATORY (INHALATION)
Qty: 3 INHALER | Refills: 1 | Status: SHIPPED | OUTPATIENT
Start: 2021-07-23 | End: 2022-05-17 | Stop reason: SDUPTHER

## 2021-07-23 RX ORDER — MIRTAZAPINE 30 MG/1
30 TABLET, FILM COATED ORAL NIGHTLY
Qty: 90 TABLET | Refills: 1 | Status: SHIPPED | OUTPATIENT
Start: 2021-07-23 | End: 2022-05-17 | Stop reason: SDUPTHER

## 2021-07-23 RX ORDER — ARIPIPRAZOLE 5 MG/1
5 TABLET ORAL DAILY
Qty: 90 TABLET | Refills: 1 | Status: SHIPPED | OUTPATIENT
Start: 2021-07-23 | End: 2022-05-17 | Stop reason: SDUPTHER
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
## 2021-07-26 ENCOUNTER — HOSPITAL ENCOUNTER (OUTPATIENT)
Dept: GENERAL RADIOLOGY | Age: 36
Discharge: HOME | End: 2021-07-28
Payer: MEDICARE

## 2021-07-26 ENCOUNTER — TELEPHONE (OUTPATIENT)
Dept: FAMILY MEDICINE CLINIC | Age: 36
End: 2021-07-26

## 2021-07-26 ENCOUNTER — HOSPITAL ENCOUNTER (OUTPATIENT)
Age: 36
Discharge: HOME | End: 2021-07-28
Payer: MEDICARE
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
## 2021-07-26 PROCEDURE — 72100 X-RAY EXAM L-S SPINE 2/3 VWS: CPT

## 2021-08-04 ENCOUNTER — HOSPITAL ENCOUNTER (OUTPATIENT)
Dept: PHYSICAL THERAPY | Age: 36
Setting detail: THERAPIES SERIES
Discharge: HOME | End: 2021-08-04
Payer: MEDICARE

## 2021-08-04 PROCEDURE — 97110 THERAPEUTIC EXERCISES: CPT

## 2021-08-04 PROCEDURE — 97161 PT EVAL LOW COMPLEX 20 MIN: CPT

## 2021-08-04 NOTE — CARE COORDINATION
.  [x] [facility]  [address]  [phone]  F: [phone] [] [address] [facility]  [address]   [address]  P: [phone]  F: [phone] [] [place]  [address] [facility]  P: [phone]  F: [phone] [] [address]  [facility]   [address]   [place]: [phone]  F: [phone]     THERAPY RESPONSIBILITY OF CARE TRANSFER FORM       PATIENT NAME: Keeley Zhao  MRN: [id number]   : 1985      TRANSFERRING FACILITY:    [] [facility]   [] [facility]   []  [facility]   [] [facility]   [] [facility]   [] [facility]   [x] [facility]  [] [facility]   [] Other:        ACCEPTING FACILITY   [] [facility]   [] [facility]   []  [facility] [address]   [] [facility]   [] [facility]   [] [facility]   [x] [facility]  [] [facility]   [] Other:          REASON FOR TRANSFER: Transferring to a permanent therapist at the facility       TRANSFER OF CARE:    I am transferring the care of the above patient to: Carolyne Silva, PT  Fabian Whelan, PT  2021      ACCEPTANCE OF CARE:     I am accepting the care of the above patient.  ARTHUR SORTO, PT
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
## 2021-08-09 ASSESSMENT — ENCOUNTER SYMPTOMS
COUGH: 0
SHORTNESS OF BREATH: 0
BACK PAIN: 1

## 2021-08-13 ENCOUNTER — HOSPITAL ENCOUNTER (OUTPATIENT)
Dept: PHYSICAL THERAPY | Age: 36
Setting detail: THERAPIES SERIES
Discharge: HOME | End: 2021-08-13
Payer: MEDICARE

## 2021-08-13 PROCEDURE — 97110 THERAPEUTIC EXERCISES: CPT
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
## 2021-08-17 ENCOUNTER — HOSPITAL ENCOUNTER (OUTPATIENT)
Dept: PHYSICAL THERAPY | Age: 36
Setting detail: THERAPIES SERIES
Discharge: HOME | End: 2021-08-17
Payer: MEDICARE

## 2021-08-17 NOTE — FLOWSHEET NOTE
[x] [facility] &  Therapy  [address]    [phone]  F: [phone]   [] [address]  [facility]   [address]  P: [phone]  F: [phone]  [] [address] &  Therapy  [address]  P: [phone]  F: [phone] [] [address]  P: [phone]  F: [phone]  [] [address]  [facility]   [address]   [place]: [phone]  F: [phone]   [] [place] [address] [facility] [address]  [place]: [phone]   F: [phone]     Physical Therapy Cancel/No Show note    Date: 2021  Patient: John Pinto  : 1985  MRN: [id number]    Cancels/No Shows to date:     For today's appointment patient:    []  Cancelled    [] Rescheduled appointment    [] No-show     Reason given by patient:    []  Patient ill    [x]  Conflicting appointment    [] No transportation      [] Conflict with work    [] No reason given    [] Weather related    [] [id number]    [] Other:      Comments:        [x] Next appointment was confirmed PREVIOUSLY    Electronically signed by: Cynthia Johns PTA

## 2021-08-20 ENCOUNTER — HOSPITAL ENCOUNTER (OUTPATIENT)
Dept: PHYSICAL THERAPY | Age: 36
Setting detail: THERAPIES SERIES
Discharge: HOME | End: 2021-08-20
Payer: MEDICARE

## 2021-08-20 PROCEDURE — 97110 THERAPEUTIC EXERCISES: CPT

## 2021-08-20 PROCEDURE — G0283 ELEC STIM OTHER THAN WOUND: HCPCS
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
## 2021-08-26 ENCOUNTER — NURSE TRIAGE (OUTPATIENT)
Dept: OTHER | Facility: CLINIC | Age: 36
End: 2021-08-26
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
## 2021-08-27 ENCOUNTER — OFFICE VISIT (OUTPATIENT)
Dept: FAMILY MEDICINE CLINIC | Age: 36
End: 2021-08-27
Payer: MEDICARE

## 2021-08-27 VITALS
WEIGHT: 220 LBS | SYSTOLIC BLOOD PRESSURE: 120 MMHG | BODY MASS INDEX: 35.94 KG/M2 | HEART RATE: 82 BPM | DIASTOLIC BLOOD PRESSURE: 82 MMHG | OXYGEN SATURATION: 97 %

## 2021-08-27 DIAGNOSIS — M54.2 NECK PAIN: Primary | ICD-10-CM

## 2021-08-27 PROCEDURE — G8417 CALC BMI ABV UP PARAM F/U: HCPCS | Performed by: NURSE PRACTITIONER

## 2021-08-27 PROCEDURE — G8427 DOCREV CUR MEDS BY ELIG CLIN: HCPCS | Performed by: NURSE PRACTITIONER

## 2021-08-27 PROCEDURE — 99213 OFFICE O/P EST LOW 20 MIN: CPT | Performed by: NURSE PRACTITIONER

## 2021-08-27 PROCEDURE — 4004F PT TOBACCO SCREEN RCVD TLK: CPT | Performed by: NURSE PRACTITIONER

## 2021-08-27 ASSESSMENT — ENCOUNTER SYMPTOMS
SHORTNESS OF BREATH: 0
COUGH: 0
BACK PAIN: 1
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
## 2021-08-31 ENCOUNTER — HOSPITAL ENCOUNTER (OUTPATIENT)
Age: 36
Discharge: HOME | End: 2021-09-02
Payer: MEDICARE

## 2021-08-31 ENCOUNTER — HOSPITAL ENCOUNTER (OUTPATIENT)
Dept: PHYSICAL THERAPY | Age: 36
Setting detail: THERAPIES SERIES
Discharge: HOME | End: 2021-08-31
Payer: MEDICARE

## 2021-08-31 ENCOUNTER — HOSPITAL ENCOUNTER (OUTPATIENT)
Dept: GENERAL RADIOLOGY | Age: 36
Discharge: HOME | End: 2021-09-02
Payer: MEDICARE

## 2021-08-31 DIAGNOSIS — M54.2 NECK PAIN: ICD-10-CM

## 2021-08-31 PROCEDURE — 97110 THERAPEUTIC EXERCISES: CPT

## 2021-08-31 PROCEDURE — 72040 X-RAY EXAM NECK SPINE 2-3 VW: CPT

## 2021-08-31 PROCEDURE — G0283 ELEC STIM OTHER THAN WOUND: HCPCS
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
## 2021-09-08 ENCOUNTER — TELEPHONE (OUTPATIENT)
Dept: FAMILY MEDICINE CLINIC | Age: 36
End: 2021-09-08

## 2021-09-08 NOTE — TELEPHONE ENCOUNTER
----- Message from Ale Vega sent at 9/8/2021  3:31 PM EDT -----  Subject: Medication Problem    QUESTIONS  Name of Medication? Tens Unit MISC  Patient-reported dosage and instructions? as needed  What question or problem do you have with the medication? Script was sent   to [facility] but they have informed patient that her insurance will not   cover device, and suggested she have prescription sent to another   pharmacy. Please send to [facility] on [employer] in [place]. Preferred Pharmacy? [address] [place] [address], [address] [place] [address] Dr Haresh Galvan [address] phone number (if available)? [phone]  Additional Information for Provider?   ---------------------------------------------------------------------------  --------------  [address]  What is the best way for the office to contact you? OK to leave message on   voicemail  Preferred Call Back Phone Number? [phone]  ---------------------------------------------------------------------------  --------------  SCRIPT ANSWERS  Relationship to Patient?  Self

## 2021-10-13 ENCOUNTER — HOSPITAL ENCOUNTER (OUTPATIENT)
Dept: PHYSICAL THERAPY | Age: 36
Setting detail: THERAPIES SERIES
Discharge: HOME | End: 2021-10-13
Payer: MEDICARE

## 2021-10-13 PROCEDURE — 97110 THERAPEUTIC EXERCISES: CPT

## 2021-10-13 PROCEDURE — G0283 ELEC STIM OTHER THAN WOUND: HCPCS
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
## 2021-10-20 ENCOUNTER — HOSPITAL ENCOUNTER (OUTPATIENT)
Dept: PHYSICAL THERAPY | Age: 36
Setting detail: THERAPIES SERIES
Discharge: HOME | End: 2021-10-20
Payer: MEDICARE

## 2021-11-03 ENCOUNTER — HOSPITAL ENCOUNTER (OUTPATIENT)
Age: 36
Discharge: HOME | End: 2021-11-03
Payer: MEDICARE

## 2021-11-03 ENCOUNTER — HOSPITAL ENCOUNTER (OUTPATIENT)
Age: 36
Setting detail: SPECIMEN
Discharge: HOME | End: 2021-11-03
Payer: MEDICARE

## 2021-11-03 ENCOUNTER — OFFICE VISIT (OUTPATIENT)
Dept: OBGYN CLINIC | Age: 36
End: 2021-11-03
Payer: MEDICARE

## 2021-11-03 VITALS
HEIGHT: 66 IN | DIASTOLIC BLOOD PRESSURE: 86 MMHG | HEART RATE: 97 BPM | SYSTOLIC BLOOD PRESSURE: 128 MMHG | WEIGHT: 224 LBS | BODY MASS INDEX: 36 KG/M2

## 2021-11-03 DIAGNOSIS — Z11.3 SCREEN FOR STD (SEXUALLY TRANSMITTED DISEASE): ICD-10-CM

## 2021-11-03 DIAGNOSIS — N91.2 AMENORRHEA: ICD-10-CM

## 2021-11-03 DIAGNOSIS — Z01.419 WOMEN'S ANNUAL ROUTINE GYNECOLOGICAL EXAMINATION: Primary | ICD-10-CM

## 2021-11-03 DIAGNOSIS — Z32.02 NEGATIVE PREGNANCY TEST: ICD-10-CM

## 2021-11-03 LAB
CONTROL: NORMAL
HBV SURFACE AB TITR SER: <3.5 MIU/ML
HEPATITIS C ANTIBODY: NONREACTIVE
HIV AG/AB: NONREACTIVE
PREGNANCY TEST URINE, POC: NEGATIVE
T. PALLIDUM, IGG: NONREACTIVE

## 2021-11-03 PROCEDURE — 87389 HIV-1 AG W/HIV-1&-2 AB AG IA: CPT

## 2021-11-03 PROCEDURE — 86317 IMMUNOASSAY INFECTIOUS AGENT: CPT

## 2021-11-03 PROCEDURE — G8484 FLU IMMUNIZE NO ADMIN: HCPCS | Performed by: CLINICAL NURSE SPECIALIST

## 2021-11-03 PROCEDURE — 86695 HERPES SIMPLEX TYPE 1 TEST: CPT

## 2021-11-03 PROCEDURE — 86696 HERPES SIMPLEX TYPE 2 TEST: CPT

## 2021-11-03 PROCEDURE — 36415 COLL VENOUS BLD VENIPUNCTURE: CPT

## 2021-11-03 PROCEDURE — 81025 URINE PREGNANCY TEST: CPT | Performed by: CLINICAL NURSE SPECIALIST

## 2021-11-03 PROCEDURE — 86694 HERPES SIMPLEX NES ANTBDY: CPT

## 2021-11-03 PROCEDURE — 86780 TREPONEMA PALLIDUM: CPT

## 2021-11-03 PROCEDURE — 99395 PREV VISIT EST AGE 18-39: CPT | Performed by: CLINICAL NURSE SPECIALIST

## 2021-11-03 PROCEDURE — 86803 HEPATITIS C AB TEST: CPT

## 2021-11-03 NOTE — PROGRESS NOTES
There was no cervical motion tenderness. The uterus was mobile, midline and regular. The adnexa no fullness, tenderness or masses appreciated. ASSESSMENT:     Normal annual well woman exam    43 y.o. Female; Annual   Diagnosis Orders   1. Women's annual routine gynecological examination     2. Screen for STD (sexually transmitted disease)  C.trachomatis N.gonorrhoeae DNA    VAGINITIS DNA PROBE    HIV Screen    T. pallidum Ab    Hepatitis C Antibody    Hepatitis B Surface Antibody    Herpes Profile   3. Amenorrhea  POCT urine pregnancy   4. Negative pregnancy test                       PLAN:  - Pap collected. Discussed new papsmear guidelines. - Birth control Discussed. Patient instructed that if she does not have a menses this month to return to office next month and will consider provera to induce withdrawal bleed. - Smoking risk factors Discussed  - Diet and exercise reviewed. - Routine healthmaintenance per patients PCP.  - Return to clinic in 1 year or earlier with questions, problems, concerns. Return for 1 year for Annual and as needed.         Electronically signed by NIKOLE Rose CNP on 11/3/2021 at 12:15 PM

## 2021-11-04 ENCOUNTER — TELEPHONE (OUTPATIENT)
Dept: OBGYN CLINIC | Age: 36
End: 2021-11-04

## 2021-11-04 DIAGNOSIS — N76.0 BV (BACTERIAL VAGINOSIS): Primary | ICD-10-CM

## 2021-11-04 DIAGNOSIS — B96.89 BV (BACTERIAL VAGINOSIS): Primary | ICD-10-CM

## 2021-11-04 LAB
C TRACH DNA GENITAL QL NAA+PROBE: NEGATIVE
DIRECT EXAM: ABNORMAL
HERPES SIMPLEX VIRUS 1 IGG: 6.02
HERPES SIMPLEX VIRUS 2 IGG: 3.15
HERPES TYPE 1/2 IGM COMBINED: 0.86
Lab: ABNORMAL
N. GONORRHOEAE DNA: NEGATIVE
SPECIMEN DESCRIPTION: ABNORMAL
SPECIMEN DESCRIPTION: NORMAL

## 2021-11-04 RX ORDER — FLUCONAZOLE 100 MG/1
100 TABLET ORAL DAILY
Qty: 7 TABLET | Refills: 0 | Status: SHIPPED | OUTPATIENT
Start: 2021-11-04 | End: 2021-11-11

## 2021-11-04 RX ORDER — METRONIDAZOLE 500 MG/1
500 TABLET ORAL 2 TIMES DAILY
Qty: 14 TABLET | Refills: 0 | Status: SHIPPED | OUTPATIENT
Start: 2021-11-04 | End: 2021-11-11

## 2021-11-04 NOTE — TELEPHONE ENCOUNTER
----- Message from NIKOLE Chauhan - CNP sent at 11/4/2021 11:23 AM EDT -----  Please let the patient know that she has bv and I sent flagyl and diflucan

## 2021-11-05 ENCOUNTER — TELEPHONE (OUTPATIENT)
Dept: OBGYN CLINIC | Age: 36
End: 2021-11-05

## 2021-11-05 NOTE — TELEPHONE ENCOUNTER
Called pt to give results she was not available lm with male to have pt call us back on Monday as our office was already closed for the day.

## 2021-11-08 ENCOUNTER — TELEPHONE (OUTPATIENT)
Dept: OBGYN CLINIC | Age: 36
End: 2021-11-08

## 2021-11-08 NOTE — TELEPHONE ENCOUNTER
----- Message from NIKOLE Potter CNP sent at 11/4/2021  2:55 PM EDT -----  Please give the patient her HSV results

## 2021-11-08 NOTE — TELEPHONE ENCOUNTER
----- Message from NIKOLE Cannon CNP sent at 11/4/2021  2:55 PM EDT -----  Please give the patient her HSV results

## 2021-12-16 ENCOUNTER — TELEPHONE (OUTPATIENT)
Dept: OBGYN CLINIC | Age: 36
End: 2021-12-16

## 2021-12-16 NOTE — TELEPHONE ENCOUNTER
Pt called states that she has had 2 outbreaks on her lips and wants to know if something can be called over.

## 2021-12-20 ENCOUNTER — OFFICE VISIT (OUTPATIENT)
Dept: FAMILY MEDICINE CLINIC | Age: 36
End: 2021-12-20
Payer: MEDICARE

## 2021-12-20 VITALS
WEIGHT: 218.2 LBS | OXYGEN SATURATION: 98 % | HEART RATE: 98 BPM | SYSTOLIC BLOOD PRESSURE: 120 MMHG | DIASTOLIC BLOOD PRESSURE: 82 MMHG | BODY MASS INDEX: 35.22 KG/M2 | TEMPERATURE: 98.4 F

## 2021-12-20 DIAGNOSIS — G56.03 CARPAL TUNNEL SYNDROME, BILATERAL: ICD-10-CM

## 2021-12-20 DIAGNOSIS — D68.59 THROMBOPHILIA (HCC): ICD-10-CM

## 2021-12-20 DIAGNOSIS — D68.51 FACTOR V LEIDEN MUTATION (HCC): ICD-10-CM

## 2021-12-20 DIAGNOSIS — R20.2 PARESTHESIA OF BOTH HANDS: ICD-10-CM

## 2021-12-20 DIAGNOSIS — S19.9XXD NECK INJURY, SUBSEQUENT ENCOUNTER: Primary | ICD-10-CM

## 2021-12-20 PROCEDURE — 4004F PT TOBACCO SCREEN RCVD TLK: CPT | Performed by: INTERNAL MEDICINE

## 2021-12-20 PROCEDURE — G8417 CALC BMI ABV UP PARAM F/U: HCPCS | Performed by: INTERNAL MEDICINE

## 2021-12-20 PROCEDURE — G8484 FLU IMMUNIZE NO ADMIN: HCPCS | Performed by: INTERNAL MEDICINE

## 2021-12-20 PROCEDURE — 99214 OFFICE O/P EST MOD 30 MIN: CPT | Performed by: INTERNAL MEDICINE

## 2021-12-20 PROCEDURE — G8427 DOCREV CUR MEDS BY ELIG CLIN: HCPCS | Performed by: INTERNAL MEDICINE

## 2021-12-20 NOTE — PROGRESS NOTES
Pt is here for pain in her neck that radiates down into her back, has pain since 06/2021     Says when she leans on the male line at work she can feel ma lump in her mid abdomin, says it does not hurt but she can tell something is there     Pt also has a rash that she thought it was dry skin but nothing is healing it, rash on both hands    Pt said when she sleeps at night both hands go numb
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
## 2021-12-22 ASSESSMENT — ENCOUNTER SYMPTOMS
ABDOMINAL PAIN: 0
NAUSEA: 0
COUGH: 0
CONSTIPATION: 0
SHORTNESS OF BREATH: 0
BLOOD IN STOOL: 0
DIARRHEA: 0
CHEST TIGHTNESS: 0
CHOKING: 0
WHEEZING: 0
VOMITING: 0
ANAL BLEEDING: 0

## 2021-12-22 ASSESSMENT — VISUAL ACUITY: OU: 1

## 2022-01-03 ENCOUNTER — TELEPHONE (OUTPATIENT)
Dept: FAMILY MEDICINE CLINIC | Age: 37
End: 2022-01-03

## 2022-01-03 NOTE — TELEPHONE ENCOUNTER
Patient called to inform that she tested positive for Covid on 12/31/21 along with her son and his girlfriend.  Patient took an at home test.       Patient was advised to treat symptoms over the counter and contact office if needed

## 2022-03-15 ENCOUNTER — OFFICE VISIT (OUTPATIENT)
Dept: FAMILY MEDICINE CLINIC | Age: 37
End: 2022-03-15
Payer: MEDICARE

## 2022-03-15 VITALS
OXYGEN SATURATION: 98 % | SYSTOLIC BLOOD PRESSURE: 122 MMHG | HEART RATE: 85 BPM | TEMPERATURE: 98.6 F | DIASTOLIC BLOOD PRESSURE: 78 MMHG | WEIGHT: 211.6 LBS | BODY MASS INDEX: 34.15 KG/M2

## 2022-03-15 DIAGNOSIS — B35.1 ONYCHOMYCOSIS: Primary | ICD-10-CM

## 2022-03-15 DIAGNOSIS — L60.3 DYSTROPHIC NAIL: ICD-10-CM

## 2022-03-15 DIAGNOSIS — Z13.1 ENCOUNTER FOR SCREENING FOR DIABETES MELLITUS: ICD-10-CM

## 2022-03-15 LAB — HBA1C MFR BLD: 4.6 %

## 2022-03-15 PROCEDURE — 4004F PT TOBACCO SCREEN RCVD TLK: CPT | Performed by: INTERNAL MEDICINE

## 2022-03-15 PROCEDURE — G8427 DOCREV CUR MEDS BY ELIG CLIN: HCPCS | Performed by: INTERNAL MEDICINE

## 2022-03-15 PROCEDURE — 83036 HEMOGLOBIN GLYCOSYLATED A1C: CPT | Performed by: INTERNAL MEDICINE

## 2022-03-15 PROCEDURE — G8417 CALC BMI ABV UP PARAM F/U: HCPCS | Performed by: INTERNAL MEDICINE

## 2022-03-15 PROCEDURE — G8484 FLU IMMUNIZE NO ADMIN: HCPCS | Performed by: INTERNAL MEDICINE

## 2022-03-15 PROCEDURE — 99213 OFFICE O/P EST LOW 20 MIN: CPT | Performed by: INTERNAL MEDICINE

## 2022-03-15 RX ORDER — TERBINAFINE HYDROCHLORIDE 250 MG/1
250 TABLET ORAL DAILY
Qty: 84 TABLET | Refills: 0 | Status: SHIPPED | OUTPATIENT
Start: 2022-03-15 | End: 2022-05-17 | Stop reason: SDUPTHER

## 2022-03-15 SDOH — ECONOMIC STABILITY: FOOD INSECURITY: WITHIN THE PAST 12 MONTHS, YOU WORRIED THAT YOUR FOOD WOULD RUN OUT BEFORE YOU GOT MONEY TO BUY MORE.: OFTEN TRUE

## 2022-03-15 SDOH — ECONOMIC STABILITY: FOOD INSECURITY: WITHIN THE PAST 12 MONTHS, THE FOOD YOU BOUGHT JUST DIDN'T LAST AND YOU DIDN'T HAVE MONEY TO GET MORE.: OFTEN TRUE

## 2022-03-15 ASSESSMENT — PATIENT HEALTH QUESTIONNAIRE - PHQ9
7. TROUBLE CONCENTRATING ON THINGS, SUCH AS READING THE NEWSPAPER OR WATCHING TELEVISION: 0
SUM OF ALL RESPONSES TO PHQ QUESTIONS 1-9: 10
SUM OF ALL RESPONSES TO PHQ QUESTIONS 1-9: 10
8. MOVING OR SPEAKING SO SLOWLY THAT OTHER PEOPLE COULD HAVE NOTICED. OR THE OPPOSITE, BEING SO FIGETY OR RESTLESS THAT YOU HAVE BEEN MOVING AROUND A LOT MORE THAN USUAL: 1
2. FEELING DOWN, DEPRESSED OR HOPELESS: 1
SUM OF ALL RESPONSES TO PHQ QUESTIONS 1-9: 10
6. FEELING BAD ABOUT YOURSELF - OR THAT YOU ARE A FAILURE OR HAVE LET YOURSELF OR YOUR FAMILY DOWN: 1
4. FEELING TIRED OR HAVING LITTLE ENERGY: 1
10. IF YOU CHECKED OFF ANY PROBLEMS, HOW DIFFICULT HAVE THESE PROBLEMS MADE IT FOR YOU TO DO YOUR WORK, TAKE CARE OF THINGS AT HOME, OR GET ALONG WITH OTHER PEOPLE: 0
SUM OF ALL RESPONSES TO PHQ QUESTIONS 1-9: 10
SUM OF ALL RESPONSES TO PHQ9 QUESTIONS 1 & 2: 1
9. THOUGHTS THAT YOU WOULD BE BETTER OFF DEAD, OR OF HURTING YOURSELF: 0
1. LITTLE INTEREST OR PLEASURE IN DOING THINGS: 0
5. POOR APPETITE OR OVEREATING: 3
3. TROUBLE FALLING OR STAYING ASLEEP: 3

## 2022-03-15 ASSESSMENT — SOCIAL DETERMINANTS OF HEALTH (SDOH): HOW HARD IS IT FOR YOU TO PAY FOR THE VERY BASICS LIKE FOOD, HOUSING, MEDICAL CARE, AND HEATING?: HARD

## 2022-03-15 NOTE — PATIENT INSTRUCTIONS
FOOD RESOURCES      RESOURCE SERVICE PHONE [employer]   Outreach of Kati Pearce 442 [phone]  Administrative [email or web address] org   Virgilard [employer] [phone]  Haresh Weemsaliciafritz 47 - and Fax N/A   Areta Mail of Varuncarlos eduardo [phone] [address] [phone] [address] / Davee Gear [phone]  [place] [address] Assistance Programs [phone]  [address] Feed Your Neighbor [phone]  Christie Alvarez [address] [phone]  [address] [facility] [address] [phone]  [address] [phone]  Service-Intake www.[email or web address] 5353 [facility] Emergency Food Pantry [phone]  Service-Intake www.[facility]. [facility] 91. [phone]  Service-Intake www.[email or web address]. [address] [place] [address] [phone]  Administrative www. [facility]. [address] Bank Back Pack Program [phone]  Roxana [address]  Meals Program [phone] [facility] and Shelter for men www.[facility]. [facility] for Social and [facility] [place] [facility] [phone] www. Merged with [facility] Hospital[facility]. [address] [place] [address] [phone]  [facility] [address] Seals [employer]. ViViFi    [facility] Women [employer] Emergency Assistance [phone] [address] [facility] Feed Your Neighbor [phone] [address] [facility] [place] [phone] www. [facility]. [place]     Living [address] [phone]  [address] Morning Blessings [phone]  Administrative N/A   [facility]  of [address] [facility] Feed Your Neighbor [phone] [address] for the Poor Food Pantry [phone]  852 [facility]. org/   [facility] Pantry  [phone] Mary [address] 13 Emergency Food and Hygiene Pantry [phone]  Administrative www. [facility]. [facility] [address] [phone]  [address] [facility] [phone]  [address] tv    Helping Hands of [address] / Ann Sun / Hari Arroyo [phone]  [facility]. [address] Pantry [facility] [phone]  [place] [address] [phone]  [address] [place] [address]  [phone]  [employer].pt    [address] [facility] Pantry [phone] [email or web address]   [facility] Pantry  [phone] N/A   Food for [address] Pantry [phone] N/A     Updated 1/30/20

## 2022-03-15 NOTE — PROGRESS NOTES
Pt is here today due to having fungus on her toe nails. She states this has been going on for years.  She has tried OTC medications but nothing is helping

## 2022-03-15 NOTE — PROGRESS NOTES
Subjective:       Patient ID:     Real Dangelo is a 42 y.o. female who presents for   Chief Complaint   Patient presents with    Nail Problem       HPI:  Nursing note reviewed and discussed with patient. Toenails are large and thick, getting worse. Has tried multiple OTC medications without relief. Here today because she has noted pain with wearing shoes, getting worse over several weeks         Patient's medications, allergies, past medical, surgical, social and family histories were reviewed and updated as appropriate. Past Medical History:   Diagnosis Date    Depression     Overactive bladder     Seizures ([facility]     Trauma      Past Surgical History:   Procedure Laterality Date    ABLATION OF DYSRHYTHMIC FOCUS  2013    CHOLECYSTECTOMY  02/03/2011    IVC FILTER INSERTION      TUBAL LIGATION  09/09/2009       Social History     Tobacco Use    Smoking status: Current Every Day Smoker     Packs/day: 0.05     Years: 25.00     Pack years: 1.25     Types: Cigarettes     Start date: 10/24/1993    Smokeless tobacco: Never Used   Substance Use Topics    Alcohol use: Yes     Comment: occasional       Patient Active Problem List   Diagnosis    Bipolar affective disorder, current episode mixed ([facility]    Factor V Leiden mutation ([facility]    Thrombophilia ([facility]    Chronic obstructive pulmonary disease ([facility]    Pure hypercholesterolemia    Nonintractable episodic headache    Dizziness         Prior to Visit Medications    Medication Sig Taking?  Authorizing Provider   rivaroxaban (XARELTO) 20 MG TABS tablet take 1 tablet by mouth once daily WITH BREAKFAST Yes Juliana Araya MD   Tens Unit MISC by Does not apply route Yes NIKOLE Canas - CNP   albuterol sulfate  (90 Base) MCG/ACT inhaler inhale 2 puffs by mouth every 4 hours if needed for wheezing Yes NIKOLE Canas - CNP   ARIPiprazole (ABILIFY) 5 MG tablet Take 1 tablet by mouth daily Yes NIKOLE Canas - CNP   divalproex (DEPAKOTE ER) 250 MG extended release tablet Take 2 tablets by mouth daily Yes NIKOLE Sutherland CNP   mirtazapine (REMERON) 30 MG tablet Take 1 tablet by mouth nightly Yes NIKOLE Sutherland CNP   tiotropium (Albina Poles) 18 MCG inhalation capsule Inhale 1 capsule into the lungs daily Yes NIKOLE Sutherland CNP   Humidifiers ([address]) MISC 1 each by Does not apply route daily Yes Jayy Resendez MD     Review of Systems  Review of Systems   Constitutional: Negative for fatigue, fever and unexpected weight change. Respiratory: Negative for cough, choking, chest tightness, shortness of breath and wheezing. Cardiovascular: Negative for chest pain, palpitations and leg swelling. Gastrointestinal: Negative for abdominal pain, anal bleeding, blood in stool, constipation, diarrhea, nausea and vomiting. Endocrine: Negative. Musculoskeletal: Negative for joint swelling and myalgias. Skin: Negative. Neurological: Negative for dizziness. Psychiatric/Behavioral: Negative for sleep disturbance. All other systems reviewed and are negative. Objective:       Physical Exam:  /78   Pulse 85   Temp 98.6 °F (37 °C) (Temporal)   Wt 211 lb 9.6 oz (96 kg)   LMP 02/15/2022   SpO2 98%   BMI 34.15 kg/m²   Wt Readings from Last 3 Encounters:   03/15/22 211 lb 9.6 oz (96 kg)   12/20/21 218 lb 3.2 oz (99 kg)   11/03/21 224 lb (101.6 kg)         Physical Exam  Vitals and nursing note reviewed. Constitutional:       Appearance: She is obese. Feet:      Right foot:      Toenail Condition: Right toenails are abnormally thick and long. Fungal disease present. Left foot:      Toenail Condition: Left toenails are abnormally thick and long. Fungal disease present. Neurological:      Mental Status: She is alert. Data Review  No visits with results within 2 Month(s) from this visit.    Latest known visit with results is: Hospital Outpatient Visit on 11/03/2021   Component Date Value    Specimen Description 11/03/2021 . VAGINA     Special Requests 11/03/2021 NOT REPORTED     Direct Exam 11/03/2021 POSITIVE for Gardnerella vaginalis. *    Direct Exam 11/03/2021 NEGATIVE for Candida sp.  Direct Exam 11/03/2021 NEGATIVE for Trichomonas vaginalis     Direct Exam 11/03/2021 Method of testing is a DNA probe intended for detection and identification of Candida species, Gardnerella vaginalis, and Trichomonas vaginalis nucleic acid in vaginal fluid specimens from patients with symptoms of vaginitis/vaginosis.  Specimen Description 11/03/2021 . CERVIX     C. trachomatis DNA 11/03/2021 NEGATIVE     N. gonorrhoeae DNA 11/03/2021 NEGATIVE      Lab Results   Component Value Date    HDL 48 09/30/2020          Assessment/Plan:      1. Onychomycosis  - [facility] Chayito cabello DPM, Podiatry, [place]  - terbinafine (LAMISIL) 250 MG tablet; Take 1 tablet by mouth daily  Dispense: 84 tablet; Refill: 0    2. Dystrophic nail  - [facility] Chayito Briseno DPM, Podiatry, [place]  - terbinafine (LAMISIL) 250 MG tablet; Take 1 tablet by mouth daily  Dispense: 84 tablet; Refill: 0    3.  Encounter for screening for diabetes mellitus  - POCT glycosylated hemoglobin (Hb A1C) - NOT COVERED/DO NOT USE FOR MEDICARE PATIENTS           Health Maintenance Due   Topic Date Due    Diabetes screen  02/22/2022       Electronically signed by Destiny Matias MD on 3/15/2022 at 6:14 PM

## 2022-03-27 ASSESSMENT — ENCOUNTER SYMPTOMS
ABDOMINAL PAIN: 0
SHORTNESS OF BREATH: 0
ANAL BLEEDING: 0
CHOKING: 0
COUGH: 0
NAUSEA: 0
CONSTIPATION: 0
BLOOD IN STOOL: 0
CHEST TIGHTNESS: 0
VOMITING: 0
DIARRHEA: 0
WHEEZING: 0

## 2022-05-16 ENCOUNTER — HOSPITAL ENCOUNTER (OUTPATIENT)
Dept: MRI IMAGING | Age: 37
Discharge: HOME | End: 2022-05-18
Payer: MEDICARE

## 2022-05-16 DIAGNOSIS — R20.2 PARESTHESIA OF BOTH HANDS: ICD-10-CM

## 2022-05-16 DIAGNOSIS — S19.9XXD NECK INJURY, SUBSEQUENT ENCOUNTER: ICD-10-CM

## 2022-05-16 PROCEDURE — 72141 MRI NECK SPINE W/O DYE: CPT

## 2022-05-17 DIAGNOSIS — B35.1 ONYCHOMYCOSIS: ICD-10-CM

## 2022-05-17 DIAGNOSIS — J44.1 CHRONIC OBSTRUCTIVE PULMONARY DISEASE WITH ACUTE EXACERBATION (HCC): ICD-10-CM

## 2022-05-17 DIAGNOSIS — D68.59 THROMBOPHILIA (HCC): ICD-10-CM

## 2022-05-17 DIAGNOSIS — F31.60 BIPOLAR AFFECTIVE DISORDER, CURRENT EPISODE MIXED, CURRENT EPISODE SEVERITY UNSPECIFIED (HCC): ICD-10-CM

## 2022-05-17 DIAGNOSIS — L60.3 DYSTROPHIC NAIL: ICD-10-CM

## 2022-05-17 DIAGNOSIS — D68.51 FACTOR V LEIDEN MUTATION (HCC): ICD-10-CM

## 2022-05-17 RX ORDER — TERBINAFINE HYDROCHLORIDE 250 MG/1
250 TABLET ORAL DAILY
Qty: 84 TABLET | Refills: 0 | Status: SHIPPED | OUTPATIENT
Start: 2022-05-17 | End: 2022-08-09

## 2022-05-17 RX ORDER — DIVALPROEX SODIUM 250 MG/1
500 TABLET, EXTENDED RELEASE ORAL DAILY
Qty: 180 TABLET | Refills: 1 | Status: SHIPPED | OUTPATIENT
Start: 2022-05-17

## 2022-05-17 RX ORDER — MIRTAZAPINE 30 MG/1
30 TABLET, FILM COATED ORAL NIGHTLY
Qty: 90 TABLET | Refills: 1 | Status: SHIPPED | OUTPATIENT
Start: 2022-05-17

## 2022-05-17 RX ORDER — ALBUTEROL SULFATE 90 UG/1
AEROSOL, METERED RESPIRATORY (INHALATION)
Qty: 1 EACH | Refills: 5 | Status: SHIPPED | OUTPATIENT
Start: 2022-05-17

## 2022-05-17 RX ORDER — ARIPIPRAZOLE 5 MG/1
5 TABLET ORAL DAILY
Qty: 90 TABLET | Refills: 1 | Status: SHIPPED | OUTPATIENT
Start: 2022-05-17 | End: 2022-06-02

## 2022-05-17 RX ORDER — TIOTROPIUM BROMIDE 18 UG/1
18 CAPSULE ORAL; RESPIRATORY (INHALATION) DAILY
Qty: 90 CAPSULE | Refills: 1 | Status: SHIPPED | OUTPATIENT
Start: 2022-05-17 | End: 2022-06-02 | Stop reason: ALTCHOICE

## 2022-05-17 NOTE — TELEPHONE ENCOUNTER
Last visit: 3/15/22  Last Med refill: 7/23/21, 12/20/21  Does patient have enough medication for 72 hours:     Next Visit Date:  Future Appointments   Date Time Provider Ed Cavanaugh   6/13/2022  3:00 PM PASQUALE Alex 61 Maintenance   Topic Date Due    COVID-19 Vaccine (1) Never done    Flu vaccine (Season Ended) 12/20/2022 (Originally 9/1/2022)    Varicella vaccine (1 of 2 - 2-dose childhood series) 03/15/2026 (Originally 2/17/1984)    DTaP/Tdap/Td vaccine (1 - Tdap) 03/15/2027 (Originally 2/17/2002)    Pneumococcal 0-64 years Vaccine (1 - PCV) 03/15/2027 (Originally 2/17/1989)    Depression Monitoring  03/15/2023    Cervical cancer screen  01/24/2024    Diabetes screen  03/15/2025    Hepatitis C screen  Completed    HIV screen  Completed    Hepatitis A vaccine  Aged Out    Hepatitis B vaccine  Aged Out    Hib vaccine  Aged Out    Meningococcal (ACWY) vaccine  Aged Out       Hemoglobin A1C (%)   Date Value   03/15/2022 4.6   02/22/2019 4.9             ( goal A1C is < 7)   No results found for: LABMICR  LDL Cholesterol (mg/dL)   Date Value   09/30/2020 140 (H)   02/22/2019 128       (goal LDL is <100)   AST (U/L)   Date Value   09/30/2020 25     ALT (U/L)   Date Value   09/30/2020 30     BUN (mg/dL)   Date Value   09/30/2020 6     BP Readings from Last 3 Encounters:   03/15/22 122/78   12/20/21 120/82   11/03/21 128/86          (goal 120/80)    All Future Testing planned in [facility]              Patient Active Problem List:     Bipolar affective disorder, current episode mixed ([address]     Factor V Leiden mutation ([facility]     Thrombophilia ([facility]     Chronic obstructive pulmonary disease ([facility]     Pure hypercholesterolemia     Nonintractable episodic headache     Dizziness

## 2022-05-19 DIAGNOSIS — R20.2 PARESTHESIA OF BOTH HANDS: ICD-10-CM

## 2022-05-19 DIAGNOSIS — S19.9XXD NECK INJURY, SUBSEQUENT ENCOUNTER: Primary | ICD-10-CM

## 2022-05-26 ENCOUNTER — TELEPHONE (OUTPATIENT)
Dept: FAMILY MEDICINE CLINIC | Age: 37
End: 2022-05-26

## 2022-05-26 NOTE — TELEPHONE ENCOUNTER
----- Message from Becky Kelsey sent at 5/26/2022 11:37 AM EDT -----  Subject: Referral Request    QUESTIONS   Reason for referral request? Patient is calling regarding to getting a   referral for a neurosurgeon due to her MRI results   Has the physician seen you for this condition before? No   Preferred Specialist (if applicable)? Do you already have an appointment scheduled? No  Additional Information for Provider?   ---------------------------------------------------------------------------  --------------  CALL BACK INFO  What is the best way for the office to contact you? OK to leave message on   voicemail  Preferred Call Back Phone Number? [phone]  ---------------------------------------------------------------------------  --------------  SCRIPT ANSWERS  Relationship to Patient?  Self

## 2022-06-02 ENCOUNTER — OFFICE VISIT (OUTPATIENT)
Dept: FAMILY MEDICINE CLINIC | Age: 37
End: 2022-06-02
Payer: MEDICARE

## 2022-06-02 VITALS
TEMPERATURE: 97.2 F | OXYGEN SATURATION: 98 % | BODY MASS INDEX: 31.76 KG/M2 | HEART RATE: 85 BPM | WEIGHT: 196.8 LBS | DIASTOLIC BLOOD PRESSURE: 72 MMHG | SYSTOLIC BLOOD PRESSURE: 118 MMHG

## 2022-06-02 DIAGNOSIS — J44.1 CHRONIC OBSTRUCTIVE PULMONARY DISEASE WITH ACUTE EXACERBATION (HCC): ICD-10-CM

## 2022-06-02 DIAGNOSIS — D68.51 FACTOR V LEIDEN MUTATION (HCC): ICD-10-CM

## 2022-06-02 DIAGNOSIS — Z72.0 TOBACCO ABUSE: ICD-10-CM

## 2022-06-02 DIAGNOSIS — D68.59 THROMBOPHILIA (HCC): Primary | ICD-10-CM

## 2022-06-02 DIAGNOSIS — F31.60 BIPOLAR AFFECTIVE DISORDER, CURRENT EPISODE MIXED, CURRENT EPISODE SEVERITY UNSPECIFIED (HCC): ICD-10-CM

## 2022-06-02 DIAGNOSIS — E78.00 PURE HYPERCHOLESTEROLEMIA: ICD-10-CM

## 2022-06-02 PROCEDURE — G8417 CALC BMI ABV UP PARAM F/U: HCPCS | Performed by: INTERNAL MEDICINE

## 2022-06-02 PROCEDURE — G8427 DOCREV CUR MEDS BY ELIG CLIN: HCPCS | Performed by: INTERNAL MEDICINE

## 2022-06-02 PROCEDURE — 4004F PT TOBACCO SCREEN RCVD TLK: CPT | Performed by: INTERNAL MEDICINE

## 2022-06-02 PROCEDURE — 99214 OFFICE O/P EST MOD 30 MIN: CPT | Performed by: INTERNAL MEDICINE

## 2022-06-02 PROCEDURE — 3023F SPIROM DOC REV: CPT | Performed by: INTERNAL MEDICINE

## 2022-06-02 RX ORDER — UMECLIDINIUM BROMIDE AND VILANTEROL TRIFENATATE 62.5; 25 UG/1; UG/1
1 POWDER RESPIRATORY (INHALATION) DAILY
Qty: 1 EACH | Refills: 5 | Status: SHIPPED | OUTPATIENT
Start: 2022-06-02

## 2022-06-02 RX ORDER — POLYETHYLENE GLYCOL 3350 17 G
4 POWDER IN PACKET (EA) ORAL
Qty: 100 EACH | Refills: 3 | Status: SHIPPED | OUTPATIENT
Start: 2022-06-02

## 2022-06-02 RX ORDER — ARIPIPRAZOLE 10 MG/1
10 TABLET ORAL DAILY
Qty: 90 TABLET | Refills: 1 | Status: SHIPPED | OUTPATIENT
Start: 2022-06-02

## 2022-06-02 ASSESSMENT — ENCOUNTER SYMPTOMS
CHEST TIGHTNESS: 0
CHOKING: 0
SHORTNESS OF BREATH: 0
ABDOMINAL PAIN: 0
BLOOD IN STOOL: 0
DIARRHEA: 0
VOMITING: 0
NAUSEA: 0
ANAL BLEEDING: 0
WHEEZING: 0
COUGH: 0
CONSTIPATION: 0

## 2022-06-02 ASSESSMENT — VISUAL ACUITY: OU: 1

## 2022-06-02 NOTE — PROGRESS NOTES
Subjective:       Patient ID:     Neetu Pierson is a 42 y.o. female who presents for   Chief Complaint   Patient presents with    Manic Behavior     f/u    Medication Check       HPI:  Nursing note reviewed and discussed with patient. Has been trying to get hold of NSGY, states has had trouble getting hold of them to schedule an appointment. Will try again later today. Patient taking Xarelto for long term anticoagulation due to hypercoagulable state. Tolerating without gingival or rectal bleeding, melena, bruising, epistaxis. Patient aware that they should avoid NSAIDs due to bleeding risk while on Xarelto    COPD:  Current treatment includes short-acting beta agonist inhaler, anticholinergic inhaler. Residual symptoms: chronic dyspnea. Denies increased dyspnea, cough, wheezing. Requires rescue inhaler 1 time(s) per day. Depression - doing well on medications when she takes them. Lately has been noticing more depression, frustration, mood swings towards depressive symptoms. Denies anhedonia, nervousness, sleep difficulty, racing thoughts, auditory or visual hallucination, thoughts of self harm, suicidal or homicidal ideation. Toenails starting to improve on lamisil. Patient's medications, allergies, past medical, surgical, social and family histories were reviewed and updated as appropriate. Past Medical History:   Diagnosis Date    Depression     Overactive bladder     Seizures ([facility] [address]     Trauma      Past Surgical History:   Procedure Laterality Date    ABLATION OF DYSRHYTHMIC FOCUS  2013    CHOLECYSTECTOMY  02/03/2011    IVC FILTER INSERTION      [employer] [facility] [place] - LOT NUMBER - [id number].     TUBAL LIGATION  09/09/2009       Social History     Tobacco Use    Smoking status: Current Every Day Smoker     Packs/day: 0.05     Years: 25.00     Pack years: 1.25     Types: Cigarettes     Start date: 10/24/1993    Smokeless tobacco: Never Used   Substance Use Topics    Alcohol use: Yes     Comment: occasional       Patient Active Problem List   Diagnosis    Bipolar affective disorder, current episode mixed ([facility]    Factor V Leiden mutation ([facility]    Thrombophilia ([facility]    Chronic obstructive pulmonary disease ([facility]    Pure hypercholesterolemia    Nonintractable episodic headache    Dizziness         Prior to Visit Medications    Medication Sig Taking? Authorizing Provider   terbinafine (LAMISIL) 250 MG tablet Take 1 tablet by mouth daily Yes Bryan Moore MD   rivaroxaban (XARELTO) 20 MG TABS tablet take 1 tablet by mouth once daily WITH BREAKFAST Yes Juliana Sharpe MD   albuterol sulfate  (90 Base) MCG/ACT inhaler inhale 2 puffs by mouth every 4 hours if needed for wheezing Yes Juliana Sharpe MD   ARIPiprazole (ABILIFY) 5 MG tablet Take 1 tablet by mouth daily Yes Juliana Sharpe MD   divalproex (DEPAKOTE ER) 250 MG extended release tablet Take 2 tablets by mouth daily Yes Juliana Sharpe MD   mirtazapine (REMERON) 30 MG tablet Take 1 tablet by mouth nightly Yes Juliana Sharpe MD   tiotropium (Aura Gey) 18 MCG inhalation capsule Inhale 1 capsule into the lungs daily Yes Bryan Moore MD   Tens Unit MISC by Does not apply route Yes NIKOLE Burris - CNP   Humidifiers ([address]) [address] [facility] 1 each by Does not apply route daily Yes Bryan Moore MD     Review of Systems  Review of Systems   Constitutional: Negative for fatigue, fever and unexpected weight change. Respiratory: Negative for cough, choking, chest tightness, shortness of breath and wheezing. Cardiovascular: Negative for chest pain, palpitations and leg swelling. Gastrointestinal: Negative for abdominal pain, anal bleeding, blood in stool, constipation, diarrhea, nausea and vomiting. Endocrine: Negative. Musculoskeletal: Negative for joint swelling and myalgias. Skin: Negative. Neurological: Negative for dizziness.    Psychiatric/Behavioral: Negative for sleep disturbance. All other systems reviewed and are negative. Objective:       Physical Exam:  /72 (Site: Left Upper Arm, Position: Sitting, Cuff Size: Medium Adult)   Pulse 85   Temp 97.2 °F (36.2 °C)   Wt 196 lb 12.8 oz (89.3 kg)   LMP 05/28/2022 (Exact Date)   SpO2 98%   BMI 31.76 kg/m²   Wt Readings from Last 3 Encounters:   06/02/22 196 lb 12.8 oz (89.3 kg)   03/15/22 211 lb 9.6 oz (96 kg)   05/16/22 209 lb (94.8 kg)         Physical Exam  Vitals and nursing note reviewed. Constitutional:       General: She is not in acute distress. Appearance: She is well-developed. She is not ill-appearing. Eyes:      General: Lids are normal. Vision grossly intact. Cardiovascular:      Rate and Rhythm: Normal rate and regular rhythm. Heart sounds: Normal heart sounds, S1 normal and S2 normal. No murmur heard. No friction rub. No gallop. Pulmonary:      Effort: Pulmonary effort is normal. No respiratory distress. Breath sounds: Normal breath sounds. No wheezing. Abdominal:      General: Bowel sounds are normal.      Palpations: Abdomen is soft. There is no mass. Tenderness: There is no abdominal tenderness. There is no guarding. Musculoskeletal:         General: Normal range of motion. Skin:     General: Skin is warm and dry. Capillary Refill: Capillary refill takes less than 2 seconds. Neurological:      General: No focal deficit present. Mental Status: She is alert and oriented to person, place, and time. Data Review  No visits with results within 2 Month(s) from this visit. Latest known visit with results is:   Office Visit on 03/15/2022   Component Date Value    Hemoglobin A1C 03/15/2022 4.6      Lab Results   Component Value Date    HDL 48 09/30/2020          Assessment/Plan:      1. Thrombophilia ([facility] [address]  Continue Xarelto    2. Factor V Leiden mutation ([facility] [address]  Continue Xarelto    3.  Chronic obstructive pulmonary disease with acute exacerbation ([address]  D/c spiriva   - umeclidinium-vilanterol (ANORO ELLIPTA) 62.5-25 MCG/INH AEPB inhaler; Inhale 1 puff into the lungs daily  Dispense: 1 each; Refill: 5    4. Bipolar affective disorder, current episode mixed, current episode severity unspecified (HCC)  - ARIPiprazole (ABILIFY) 10 mg tablet; Take 1 tablet by mouth daily  Dispense: 90 tablet; Refill: 1  Continue Depakote, Remeron    5. Pure hypercholesterolemia  Continue current management    6. Tobacco abuse  - nicotine polacrilex (NICORETTE) 4 mg lozenge; Take 1 lozenge by mouth every hour as needed for Smoking cessation  Dispense: 100 each; Refill: 3       Patient Instructions   Stop spiriva - Start Anoro instead once daily   Increase the Abilify to 10mg - take two pills daily of current 5mg dose till they are gone then start 10mg pills once daily   Let me know if you have trouble getting hold of neurosurgery to get an appointment           There are no preventive care reminders to display for this patient.     Electronically signed by Tricia Zee MD on 6/2/2022 at 9:06 AM

## 2022-06-02 NOTE — PATIENT INSTRUCTIONS
Stop spiriva - Start Anoro instead once daily   Increase the Abilify to 10mg - take two pills daily of current 5mg dose till they are gone then start 10mg pills once daily   Let me know if you have trouble getting hold of neurosurgery to get an appointment

## 2022-06-02 NOTE — PROGRESS NOTES
Pt is here for her regular f/u    Pt states she is doing good other than the pain in her neck that she has every day states provider is already aware of the pain

## 2022-06-24 ENCOUNTER — OFFICE VISIT (OUTPATIENT)
Dept: NEUROSURGERY | Age: 37
End: 2022-06-24
Payer: MEDICARE

## 2022-06-24 VITALS
DIASTOLIC BLOOD PRESSURE: 73 MMHG | HEIGHT: 66 IN | BODY MASS INDEX: 32.88 KG/M2 | TEMPERATURE: 97.3 F | OXYGEN SATURATION: 94 % | WEIGHT: 204.6 LBS | SYSTOLIC BLOOD PRESSURE: 105 MMHG | HEART RATE: 84 BPM

## 2022-06-24 DIAGNOSIS — M47.812 CERVICAL SPONDYLOSIS: Primary | ICD-10-CM

## 2022-06-24 PROCEDURE — G8417 CALC BMI ABV UP PARAM F/U: HCPCS | Performed by: NURSE PRACTITIONER

## 2022-06-24 PROCEDURE — 99203 OFFICE O/P NEW LOW 30 MIN: CPT | Performed by: NURSE PRACTITIONER

## 2022-06-24 PROCEDURE — 4004F PT TOBACCO SCREEN RCVD TLK: CPT | Performed by: NURSE PRACTITIONER

## 2022-06-24 PROCEDURE — G8427 DOCREV CUR MEDS BY ELIG CLIN: HCPCS | Performed by: NURSE PRACTITIONER
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
## 2022-07-13 ENCOUNTER — TELEPHONE (OUTPATIENT)
Dept: ADMINISTRATIVE | Age: 37
End: 2022-07-13

## 2022-07-13 NOTE — TELEPHONE ENCOUNTER
Pt has referral to see Dr. Atul Turner, unable to schedule in 2 week time frame please call [phone]  Unable to reach office, psc/sc no

## 2022-08-29 ENCOUNTER — INITIAL CONSULT (OUTPATIENT)
Dept: PAIN MANAGEMENT | Age: 37
End: 2022-08-29
Payer: MEDICARE

## 2022-08-29 VITALS — HEIGHT: 66 IN | WEIGHT: 212.4 LBS | BODY MASS INDEX: 34.13 KG/M2

## 2022-08-29 DIAGNOSIS — M47.812 CERVICAL SPONDYLOSIS: Primary | ICD-10-CM

## 2022-08-29 DIAGNOSIS — M47.812 CERVICAL SPONDYLOSIS WITHOUT MYELOPATHY: ICD-10-CM

## 2022-08-29 DIAGNOSIS — M47.817 LUMBOSACRAL SPONDYLOSIS WITHOUT MYELOPATHY: ICD-10-CM

## 2022-08-29 PROCEDURE — G8417 CALC BMI ABV UP PARAM F/U: HCPCS | Performed by: PAIN MEDICINE

## 2022-08-29 PROCEDURE — 4004F PT TOBACCO SCREEN RCVD TLK: CPT | Performed by: PAIN MEDICINE

## 2022-08-29 PROCEDURE — G8427 DOCREV CUR MEDS BY ELIG CLIN: HCPCS | Performed by: PAIN MEDICINE

## 2022-08-29 PROCEDURE — 99204 OFFICE O/P NEW MOD 45 MIN: CPT | Performed by: PAIN MEDICINE

## 2022-08-29 ASSESSMENT — ENCOUNTER SYMPTOMS
BACK PAIN: 1
BOWEL INCONTINENCE: 0
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
## 2022-08-31 ENCOUNTER — TELEPHONE (OUTPATIENT)
Dept: FAMILY MEDICINE CLINIC | Age: 37
End: 2022-08-31

## 2022-08-31 DIAGNOSIS — D68.51 FACTOR V LEIDEN MUTATION (HCC): Primary | ICD-10-CM

## 2022-08-31 DIAGNOSIS — D68.59 THROMBOPHILIA (HCC): ICD-10-CM

## 2022-08-31 RX ORDER — ENOXAPARIN SODIUM 100 MG/ML
100 INJECTION SUBCUTANEOUS 2 TIMES DAILY
Qty: 8 ML | Refills: 0 | Status: SHIPPED | OUTPATIENT
Start: 2022-08-31 | End: 2022-09-04

## 2022-08-31 NOTE — TELEPHONE ENCOUNTER
Informed patient of Lovenox. She stated she is going to stop by the office tomorrow to  a copy of these instructions. States her procedure is not until October.

## 2022-08-31 NOTE — TELEPHONE ENCOUNTER
Geeta-op anticoagulation management  Procedure date: unknown  Long term anticoagulant: Xarelto  Indication for anticoagulation: factor V leiden mutation, thrombophilia    Last dose of oral anticoagulant (Xarelto): 3 days prior to procedure (e.g. - if procedure on 9/10, last dose on 9/7/22)  Start date for lovenox: two days prior (1mg/kg BID or 100mg BID)  Hold lovenox on day of procedure before going in, take it after coming home from hospital   Resume oral anticoagulant on the day after procedure  Last date of lovenox is day of procedure    Lovenox prescription sent to pharmacy. Please notify patient.  If has questions about dates, can call once the date of procedure is scheduled

## 2022-09-12 ENCOUNTER — HOSPITAL ENCOUNTER (OUTPATIENT)
Dept: MRI IMAGING | Age: 37
Discharge: HOME | End: 2022-09-14
Payer: MEDICARE

## 2022-09-12 DIAGNOSIS — M47.817 LUMBOSACRAL SPONDYLOSIS WITHOUT MYELOPATHY: ICD-10-CM

## 2022-09-12 PROCEDURE — 72148 MRI LUMBAR SPINE W/O DYE: CPT

## 2022-10-03 ENCOUNTER — OFFICE VISIT (OUTPATIENT)
Dept: PAIN MANAGEMENT | Age: 37
End: 2022-10-03
Payer: MEDICARE

## 2022-10-03 VITALS — BODY MASS INDEX: 34.84 KG/M2 | HEIGHT: 66 IN | WEIGHT: 216.8 LBS

## 2022-10-03 DIAGNOSIS — M47.817 LUMBOSACRAL SPONDYLOSIS WITHOUT MYELOPATHY: Primary | ICD-10-CM

## 2022-10-03 DIAGNOSIS — M47.812 CERVICAL SPONDYLOSIS WITHOUT MYELOPATHY: ICD-10-CM

## 2022-10-03 PROCEDURE — G8427 DOCREV CUR MEDS BY ELIG CLIN: HCPCS | Performed by: PAIN MEDICINE

## 2022-10-03 PROCEDURE — G8417 CALC BMI ABV UP PARAM F/U: HCPCS | Performed by: PAIN MEDICINE

## 2022-10-03 PROCEDURE — 99214 OFFICE O/P EST MOD 30 MIN: CPT | Performed by: PAIN MEDICINE

## 2022-10-03 PROCEDURE — 4004F PT TOBACCO SCREEN RCVD TLK: CPT | Performed by: PAIN MEDICINE

## 2022-10-03 PROCEDURE — G8484 FLU IMMUNIZE NO ADMIN: HCPCS | Performed by: PAIN MEDICINE

## 2022-10-03 ASSESSMENT — ENCOUNTER SYMPTOMS
BACK PAIN: 1
BOWEL INCONTINENCE: 0
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
## 2022-11-03 ENCOUNTER — HOSPITAL ENCOUNTER (OUTPATIENT)
Dept: PAIN MANAGEMENT | Facility: CLINIC | Age: 37
Discharge: HOME | End: 2022-11-03

## 2022-11-03 ENCOUNTER — TELEPHONE (OUTPATIENT)
Dept: ADMINISTRATIVE | Age: 37
End: 2022-11-03

## 2022-11-03 NOTE — TELEPHONE ENCOUNTER
Patient states she cannot make it to procedure due to no , unable to reach office, please call [phone], psc/sc

## 2022-11-03 NOTE — TELEPHONE ENCOUNTER
Patient did not call until past her arrival time, patient is considered a late cancel for procedure and will be brought in for office visit

## 2022-11-03 NOTE — DISCHARGE INSTRUCTIONS
You have received a sedative/anesthetic therefore you should not consume any alcoholic beverages for 24 hours. Do not drive or operate machinery for 24 hours. Do not take a tub bath for 72 hours after procedure (this includes hot tubs). You may shower, but avoid hot water to injection site. Avoid strenuous activity TODAY especially if you experience dizziness. Remove band-aid the next day. Wash off any residual iodine 24 hours from today. Do not use heat, heating pad, or any other heating device over the injection site for 3 days after the procedure. If you experience pain after your procedure, you may continue with your current pain medication as prescribed. (DO NOT INCREASE YOUR PAIN MEDICATION WITHOUT TALKING TO DOCTOR)  Soreness and pain at injection site is common, may use ice to reduce soreness. Please complete pain diary as instructed.      Call Magda Mcallister at [phone] if you experience:   Fever, chills or temperature over 100    Vomiting, headache, persistent stiff neck, nausea or blurred vision   Difficulty urinating or unable to urinate within 8 hours   Increase in weakness, numbness or loss of function of limbs  Increased redness, swelling or drainage at the injection site

## 2022-11-09 ENCOUNTER — TELEPHONE (OUTPATIENT)
Dept: PAIN MANAGEMENT | Age: 37
End: 2022-11-09

## 2022-11-09 NOTE — TELEPHONE ENCOUNTER
Patient is requesting a call regarding rescheduling a missed procedure and can be reached at  [phone]. Okay to leave a message.

## 2022-11-10 NOTE — TELEPHONE ENCOUNTER
Left vm for patient to call back and schedule her procedure. Previously cancelled due to no ride, will need OV before procedure.

## 2022-12-01 ENCOUNTER — HOSPITAL ENCOUNTER (OUTPATIENT)
Dept: PAIN MANAGEMENT | Facility: CLINIC | Age: 37
Discharge: HOME | End: 2022-12-01
Payer: MEDICARE

## 2022-12-01 VITALS
WEIGHT: 197 LBS | DIASTOLIC BLOOD PRESSURE: 74 MMHG | HEART RATE: 67 BPM | OXYGEN SATURATION: 98 % | HEIGHT: 66 IN | RESPIRATION RATE: 12 BRPM | SYSTOLIC BLOOD PRESSURE: 111 MMHG | BODY MASS INDEX: 31.66 KG/M2 | TEMPERATURE: 97 F

## 2022-12-01 DIAGNOSIS — R52 PAIN MANAGEMENT: ICD-10-CM

## 2022-12-01 LAB — HCG, PREGNANCY URINE (POC): NEGATIVE

## 2022-12-01 PROCEDURE — 81025 URINE PREGNANCY TEST: CPT

## 2022-12-01 PROCEDURE — 64495 INJ PARAVERT F JNT L/S 3 LEV: CPT

## 2022-12-01 PROCEDURE — 64493 INJ PARAVERT F JNT L/S 1 LEV: CPT

## 2022-12-01 PROCEDURE — 2500000003 HC RX 250 WO HCPCS: Performed by: PAIN MEDICINE

## 2022-12-01 PROCEDURE — 64494 INJ PARAVERT F JNT L/S 2 LEV: CPT

## 2022-12-01 PROCEDURE — 6360000002 HC RX W HCPCS: Performed by: PAIN MEDICINE

## 2022-12-01 RX ORDER — BUPIVACAINE HYDROCHLORIDE 2.5 MG/ML
INJECTION, SOLUTION EPIDURAL; INFILTRATION; INTRACAUDAL
Status: COMPLETED | OUTPATIENT
Start: 2022-12-01 | End: 2022-12-01

## 2022-12-01 RX ORDER — MIDAZOLAM HYDROCHLORIDE 2 MG/2ML
INJECTION, SOLUTION INTRAMUSCULAR; INTRAVENOUS
Status: COMPLETED | OUTPATIENT
Start: 2022-12-01 | End: 2022-12-01

## 2022-12-01 RX ADMIN — BUPIVACAINE HYDROCHLORIDE 5 ML: 2.5 INJECTION, SOLUTION EPIDURAL; INFILTRATION; INTRACAUDAL; PERINEURAL at 14:43

## 2022-12-01 RX ADMIN — MIDAZOLAM HYDROCHLORIDE 2 MG: 1 INJECTION, SOLUTION INTRAMUSCULAR; INTRAVENOUS at 14:34

## 2022-12-01 RX ADMIN — BUPIVACAINE HYDROCHLORIDE 5 ML: 2.5 INJECTION, SOLUTION EPIDURAL; INFILTRATION; INTRACAUDAL; PERINEURAL at 14:40

## 2022-12-01 ASSESSMENT — PAIN DESCRIPTION - ORIENTATION: ORIENTATION: LOWER

## 2022-12-01 ASSESSMENT — PAIN - FUNCTIONAL ASSESSMENT
PAIN_FUNCTIONAL_ASSESSMENT: PREVENTS OR INTERFERES SOME ACTIVE ACTIVITIES AND ADLS
PAIN_FUNCTIONAL_ASSESSMENT: 0-10

## 2022-12-01 ASSESSMENT — PAIN DESCRIPTION - LOCATION: LOCATION: BACK

## 2022-12-01 ASSESSMENT — PAIN SCALES - GENERAL: PAINLEVEL_OUTOF10: 5

## 2022-12-01 NOTE — DISCHARGE INSTRUCTIONS
You have received a sedative/anesthetic therefore you should not consume any alcoholic beverages for 24 hours. Do not drive or operate machinery for 24 hours. Do not take a tub bath for 72 hours after procedure (this includes hot tubs). You may shower, but avoid hot water to injection site. Avoid strenuous activity TODAY especially if you experience dizziness. Remove band-aid the next day. Wash off any residual iodine 24 hours from today. Do not use heat, heating pad, or any other heating device over the injection site for 3 days after the procedure. If you experience pain after your procedure, you may continue with your current pain medication as prescribed. (DO NOT INCREASE YOUR PAIN MEDICATION WITHOUT TALKING TO DOCTOR)  Soreness and pain at injection site is common, may use ice to reduce soreness. Please complete pain diary as instructed.      Call Magda Mcallister at [phone] if you experience:   Fever, chills or temperature over 100    Vomiting, headache, persistent stiff neck, nausea or blurred vision   Difficulty urinating or unable to urinate within 8 hours   Increase in weakness, numbness or loss of function of limbs  Increased redness, swelling or drainage at the injection site

## 2022-12-01 NOTE — OP NOTE
Lumbar Facet Nerve Block Injection:  Surgeon: Angie Lou MD     PRE-OP DIAGNOSIS: M47.817 (lumbosacral spondylosis), M54.5 (low back pain)    POST-OP DIAGNOSIS: Same. PROCEDURE PERFORMED: Lumbar Facet Nerve Block Multiple Levels  Bilateral L4 - 5 and L5 - S1. Physician confirmed and marked the surgical site. EBL: minimal      CONSENT: Patient has undergone the educational process with this procedure, is aware and fully understands the risks involved: potential damage to any and all body organs including possible bleeding, infection and nerve injury, allergic reaction and headache. Patient also understands that the procedure will be undertaken in a safe, controlled, and monitored setting. Patient recognizes that the benefits include relief from pain and reduction in the oral use of medications. Patient agreed to proceed. The patient was counseled at length about the risks of checo Covid-19 during their perioperative period and any recovery window from their procedure. The patient was made aware that checo Covid-19  may worsen their prognosis for recovering from their procedure  and lend to a higher morbidity and/or mortality risk. All material risks, benefits, and reasonable alternatives including postponing the procedure were discussed. The patient does wish to proceed with the procedure at this time. PREP: Timeout was performed prior to starting the procedure. The patient's back was prepped with chloroprep and draped appropriately. 5ml of 0.5% lidocaine was used to anesthetize the skin and subcutaneous tissue. PROCEDURE NOTE: A 25 gauge 3.5 inch spinal needle was advanced under  fluoroscopic guidance to the appropriate anatomic location for the medial branches corresponding to the facets at the base of the appropriate superior articular process and/or sacral ala . Aspiration was negative for blood, CSF and producing pain.  1 ml of 0.25% marcaine was then injected at each site to block medial branch nerve innervating the  Bilateral L4 - 5 and L5 - S1 facet joints. Patient tolerated the procedure well, no complications occurred. At the end of the injection the physician withdrew the needle and the nurse applied a sterile bandage to the site. Patient transferred to the recovery room in satisfactory condition. Appropriate written discharge instructions were given to the patient. If good results are obtained, Patient would be a candidate for Radiofrequency Ablation.       Angie Lou MD

## 2022-12-01 NOTE — H&P
Pain Pre-Op H&P Note    Jo De Jesus MD    HPI: Marialuisa Haynes  presents with back pain. Past Medical History:   Diagnosis Date    Depression     Overactive bladder     Seizures ([address]     Trauma        Past Surgical History:   Procedure Laterality Date    ABLATION OF DYSRHYTHMIC FOCUS  2013    CHOLECYSTECTOMY  02/03/2011    IVC FILTER INSERTION      BRITTANY Hillman - LOT NUMBER - [id number].     TUBAL LIGATION  09/09/2009       Family History   Problem Relation Age of Onset    Diabetes Paternal Grandfather     Diabetes Father     Hypertension Father     Cancer Mother     Cancer Brother        Allergies   Allergen Reactions    Morphine And Related Anaphylaxis    Bactrim [Sulfamethoxazole-Trimethoprim] Nausea And Vomiting    Clindamycin/Lincomycin Nausea And Vomiting    Hydrocodone-Acetaminophen Other (See Comments)    Morphine Other (See Comments)    Tramadol Other (See Comments)         Current Outpatient Medications:     umeclidinium-vilanterol (ANORO ELLIPTA) 62.5-25 MCG/INH AEPB inhaler, Inhale 1 puff into the lungs daily, Disp: 1 each, Rfl: 5    ARIPiprazole (ABILIFY) 10 mg tablet, Take 1 tablet by mouth daily, Disp: 90 tablet, Rfl: 1    nicotine polacrilex (NICORETTE) 4 mg lozenge, Take 1 lozenge by mouth every hour as needed for Smoking cessation (Patient not taking: Reported on 12/1/2022), Disp: 100 each, Rfl: 3    rivaroxaban (XARELTO) 20 MG TABS tablet, take 1 tablet by mouth once daily WITH BREAKFAST, Disp: 90 tablet, Rfl: 1    albuterol sulfate  (90 Base) MCG/ACT inhaler, inhale 2 puffs by mouth every 4 hours if needed for wheezing, Disp: 1 each, Rfl: 5    divalproex (DEPAKOTE ER) 250 MG extended release tablet, Take 2 tablets by mouth daily, Disp: 180 tablet, Rfl: 1    mirtazapine (REMERON) 30 MG tablet, Take 1 tablet by mouth nightly, Disp: 90 tablet, Rfl: 1    Tens Unit MISC, by Does not apply route, Disp: 1 each, Rfl: 0    Humidifiers (COOL MIST HUMIDIFIER) MISC, 1 each by Does not apply route daily, Disp: 1 each, Rfl: 0    Social History     Tobacco Use    Smoking status: Every Day     Packs/day: 0.50     Types: Cigarettes     Start date: 10/24/1993    Smokeless tobacco: Never    Tobacco comments:     currently smoking 0.5-1 ppd - 6/2/22   Substance Use Topics    Alcohol use: Yes     Comment: occasional        Review of Systems:   Focused review of systems was performed, and negative as pertinent to diagnosis, except as stated in HPI. Physical Exam  Constitutional:       Appearance: Normal appearance. Pulmonary:      Effort: Pulmonary effort is normal.   Neurological:      Mental Status: alert. Psychiatric:         Attention and Perception: Attention and perception normal.         Mood and Affect: Mood and affect normal.   Cardiovascular:      Rate: Normal rate. ASA: 3          Mallampati: 2       Patient's current physical status, medications, medical history, and HPI have been reviewed and updated as appropriate on this date: 12/01/22    Risk/Benefit(s): The risks, benefits, alternatives, and potential complications have been discussed with the patient/family and informed consent has been obtained for the procedure/sedation.     Diagnosis:   Back pain      Plan: james Torre MD

## 2022-12-06 ENCOUNTER — TELEPHONE (OUTPATIENT)
Dept: PAIN MANAGEMENT | Age: 37
End: 2022-12-06

## 2022-12-06 NOTE — TELEPHONE ENCOUNTER
Procedure: Lumbar Facet Nerve Block Multiple Levels  Bilateral L4 - 5 and L5 - S1  DOS: 12/01/22  Pain level before procedure with activity 7-9  Pain with activity after procedure 10  What activities done the day of procedure Dishes,house hole, chores  What percentage of  pain relief from procedure did you receive 0%  Success N  OV Scheduled  12/13/22

## 2022-12-13 ENCOUNTER — OFFICE VISIT (OUTPATIENT)
Dept: PAIN MANAGEMENT | Age: 37
End: 2022-12-13
Payer: MEDICARE

## 2022-12-13 VITALS — WEIGHT: 197 LBS | HEIGHT: 66 IN | BODY MASS INDEX: 31.66 KG/M2

## 2022-12-13 DIAGNOSIS — M47.812 CERVICAL SPONDYLOSIS WITHOUT MYELOPATHY: ICD-10-CM

## 2022-12-13 DIAGNOSIS — G89.29 CHRONIC NECK PAIN: Primary | ICD-10-CM

## 2022-12-13 DIAGNOSIS — M54.2 CHRONIC NECK PAIN: Primary | ICD-10-CM

## 2022-12-13 DIAGNOSIS — M54.2 CERVICALGIA: ICD-10-CM

## 2022-12-13 PROCEDURE — G8417 CALC BMI ABV UP PARAM F/U: HCPCS | Performed by: NURSE PRACTITIONER

## 2022-12-13 PROCEDURE — G8484 FLU IMMUNIZE NO ADMIN: HCPCS | Performed by: NURSE PRACTITIONER

## 2022-12-13 PROCEDURE — 99214 OFFICE O/P EST MOD 30 MIN: CPT | Performed by: NURSE PRACTITIONER

## 2022-12-13 PROCEDURE — G8427 DOCREV CUR MEDS BY ELIG CLIN: HCPCS | Performed by: NURSE PRACTITIONER

## 2022-12-13 PROCEDURE — 4004F PT TOBACCO SCREEN RCVD TLK: CPT | Performed by: NURSE PRACTITIONER

## 2022-12-13 ASSESSMENT — ENCOUNTER SYMPTOMS
CONSTIPATION: 0
SHORTNESS OF BREATH: 0
BOWEL INCONTINENCE: 0
COUGH: 0
BACK PAIN: 1

## 2022-12-13 NOTE — PROGRESS NOTES
Chief Complaint   Patient presents with    Back Pain    Follow Up After Procedure     Lumbar Facet Nerve Block Multiple Levels  Bilateral L4 - 5 and L5 - S1.          [facility]  Pt is here for follow up after lumbar MBB 12/1/22. She reports no relief following the procedure. She has tried PT for her back and her neck and reports no benefit. Did discuss LESI and she declines. She states her neck pain is more bothersome than her low back at this time. Discussed cervical MBB and she would like to try this. She is taking Xarelto and will need clearance to hold for procedure. Back Pain  This is a chronic problem. The current episode started more than 1 year ago. The problem occurs constantly. The problem has been gradually worsening since onset. The pain is present in the lumbar spine. The quality of the pain is described as aching, burning, cramping, shooting and stabbing. The pain radiates to the left foot. The pain is at a severity of 8/10. The pain is mild. The pain is The same all the time. The symptoms are aggravated by position, twisting, bending, lying down, sitting and standing. Stiffness is present In the morning. Pertinent negatives include no bladder incontinence, bowel incontinence, chest pain or fever. She has tried walking, bed rest and home exercises for the symptoms. The treatment provided no relief. Patient denies any new neurological symptoms. No bowel or bladder incontinence, no weakness, and no falling. Past Medical History:   Diagnosis Date    Depression     Overactive bladder     Seizures ([facility] [address]     Trauma        Past Surgical History:   Procedure Laterality Date    ABLATION OF DYSRHYTHMIC FOCUS  2013    CHOLECYSTECTOMY  02/03/2011    IVC FILTER INSERTION      GREEN FIELD Ele Mena - LOT NUMBER - [id number].     TUBAL LIGATION  09/09/2009       Allergies   Allergen Reactions    Morphine And Related Anaphylaxis    Bactrim [Sulfamethoxazole-Trimethoprim] Nausea And Vomiting Clindamycin/Lincomycin Nausea And Vomiting    Hydrocodone-Acetaminophen Other (See Comments)    Morphine Other (See Comments)    Tramadol Other (See Comments)         Current Outpatient Medications:     umeclidinium-vilanterol (ANORO ELLIPTA) 62.5-25 MCG/INH AEPB inhaler, Inhale 1 puff into the lungs daily, Disp: 1 each, Rfl: 5    ARIPiprazole (ABILIFY) 10 mg tablet, Take 1 tablet by mouth daily, Disp: 90 tablet, Rfl: 1    nicotine polacrilex (NICORETTE) 4 mg lozenge, Take 1 lozenge by mouth every hour as needed for Smoking cessation (Patient not taking: Reported on 12/1/2022), Disp: 100 each, Rfl: 3    rivaroxaban (XARELTO) 20 MG TABS tablet, take 1 tablet by mouth once daily WITH BREAKFAST, Disp: 90 tablet, Rfl: 1    albuterol sulfate  (90 Base) MCG/ACT inhaler, inhale 2 puffs by mouth every 4 hours if needed for wheezing, Disp: 1 each, Rfl: 5    divalproex (DEPAKOTE ER) 250 MG extended release tablet, Take 2 tablets by mouth daily, Disp: 180 tablet, Rfl: 1    mirtazapine (REMERON) 30 MG tablet, Take 1 tablet by mouth nightly, Disp: 90 tablet, Rfl: 1    Tens Unit MISC, by Does not apply route, Disp: 1 each, Rfl: 0    Humidifiers (COOL MIST HUMIDIFIER) MISC, 1 each by Does not apply route daily, Disp: 1 each, Rfl: 0    Family History   Problem Relation Age of Onset    Diabetes Paternal Grandfather     Diabetes Father     Hypertension Father     Cancer Mother     Cancer Brother        Social History     Socioeconomic History    Marital status: Single     Spouse name: Not on file    Number of children: 4    Years of education: Not on file    Highest education level: Not on file   Occupational History    Not on file   Tobacco Use    Smoking status: Every Day     Packs/day: 0.50     Types: Cigarettes     Start date: 10/24/1993    Smokeless tobacco: Never    Tobacco comments:     currently smoking 0.5-1 ppd - 6/2/22   Vaping Use    Vaping Use: Never used   Substance and Sexual Activity    Alcohol use:  Yes Comment: occasional     Drug use: Yes     Types: Marijuana [facility])    Sexual activity: Yes   Other Topics Concern    Not on file   Social History Narrative    Not on file     Social Determinants of Health     Financial Resource Strain: High Risk    Difficulty of Paying Living Expenses: Hard   Food Insecurity: Food Insecurity Present    Worried About Running Out of Food in the Last Year: Often true    Ran Out of Food in the Last Year: Often true   Transportation Needs: Not on file   Physical Activity: Not on file   Stress: Not on file   Social Connections: Not on file   Intimate Partner Violence: Not on file   Housing Stability: Not on file       Review of Systems:  Review of Systems   Constitutional: Negative for chills and fever. Cardiovascular:  Negative for chest pain and palpitations. Respiratory:  Negative for cough and shortness of breath. Musculoskeletal:  Positive for back pain and neck pain. Gastrointestinal:  Negative for bowel incontinence and constipation. Genitourinary:  Negative for bladder incontinence. Neurological:  Negative for disturbances in coordination and loss of balance. Physical Exam:  Ht 5' 6\" (1.676 m)   Wt 197 lb (89.4 kg)   BMI 31.80 kg/m²     Physical Exam  HENT:      Head: Normocephalic. Pulmonary:      Effort: Pulmonary effort is normal.   Musculoskeletal:         General: Normal range of motion. Cervical back: Normal range of motion. Tenderness present. Lumbar back: Tenderness present. Skin:     General: Skin is warm and dry. Neurological:      Mental Status: She is alert and oriented to person, place, and time. Record/Diagnostics Review:    FINDINGS:   BONES/ALIGNMENT: There is straightening of cervical lordosis. The vertebral   body heights are maintained. The bone marrow signal appears unremarkable. SPINAL CORD: No abnormal cord signal is seen. SOFT TISSUES: No paraspinal mass identified.        C2-C3: There is no significant disc protrusion, spinal canal stenosis or   neural foraminal narrowing. C3-C4: There is no significant disc protrusion, spinal canal stenosis or   neural foraminal narrowing. C4-C5: Subtle disc bulging. Subtle anterolisthesis. Otherwise unremarkable. C5-C6: Subtle disc bulging. Mild right facet arthropathy. Otherwise   unremarkable. C6-C7: There is no significant disc protrusion, spinal canal stenosis or   neural foraminal narrowing. C7-T1: There is no significant disc protrusion, spinal canal stenosis or   neural foraminal narrowing. Impression   No cord signal abnormality. No high-grade canal or foraminal stenosis. No nerve impingement. No   significant posterior disc pathology. At C4-C5 and C5-C6, subtle disc bulging. Assessment:  Problem List Items Addressed This Visit       Cervicalgia - Primary    Relevant Orders    Facet Joint C/T    Cervical spondylosis without myelopathy    Relevant Orders    Facet Joint C/T          Treatment Plan:    No relief following lumbar MBB  Discussed ANTONY and she declines - she states her neck pain is more bothersome at this time    Has failed conservative options, significant axial neck pain pain with degenerative facet changes on MRI, good candidate for diagnostic cervical facets at bilateral C4-5 and C5-6 to see if pain is facet mediated, if this is beneficial would be a candidate for radiofrequency ablation. Will need clearance to hold Xarelto before scheduling  XR cervical spine    Follow up after procedure      I have reviewed the chief complaint and history of present illness (including ROS and PFSH) and vital documentation by my staff and I agree with their documentation and have added where applicable.

## 2023-05-19 ENCOUNTER — HOSPITAL ENCOUNTER (OUTPATIENT)
Age: 38
End: 2023-05-19
Payer: MEDICAID

## 2023-05-19 ENCOUNTER — HOSPITAL ENCOUNTER (OUTPATIENT)
Dept: GENERAL RADIOLOGY | Age: 38
End: 2023-05-19
Payer: MEDICAID

## 2023-05-19 DIAGNOSIS — M54.2 CHRONIC NECK PAIN: ICD-10-CM

## 2023-05-19 DIAGNOSIS — G89.29 CHRONIC NECK PAIN: ICD-10-CM

## 2023-05-19 PROCEDURE — 72040 X-RAY EXAM NECK SPINE 2-3 VW: CPT

## 2023-07-21 ENCOUNTER — OFFICE VISIT (OUTPATIENT)
Dept: PAIN MANAGEMENT | Age: 38
End: 2023-07-21
Payer: MEDICAID

## 2023-07-21 VITALS — BODY MASS INDEX: 36 KG/M2 | HEIGHT: 66 IN | WEIGHT: 224 LBS

## 2023-07-21 DIAGNOSIS — M47.812 CERVICAL SPONDYLOSIS WITHOUT MYELOPATHY: ICD-10-CM

## 2023-07-21 DIAGNOSIS — M54.2 CHRONIC NECK PAIN: Primary | ICD-10-CM

## 2023-07-21 DIAGNOSIS — M47.812 CERVICAL SPONDYLOSIS: ICD-10-CM

## 2023-07-21 DIAGNOSIS — G89.29 CHRONIC NECK PAIN: Primary | ICD-10-CM

## 2023-07-21 DIAGNOSIS — M47.817 LUMBOSACRAL SPONDYLOSIS WITHOUT MYELOPATHY: ICD-10-CM

## 2023-07-21 PROCEDURE — 99214 OFFICE O/P EST MOD 30 MIN: CPT | Performed by: NURSE PRACTITIONER

## 2023-07-21 ASSESSMENT — ENCOUNTER SYMPTOMS
BACK PAIN: 1
SHORTNESS OF BREATH: 0
COUGH: 0
BOWEL INCONTINENCE: 0
CONSTIPATION: 0

## 2023-07-21 NOTE — PROGRESS NOTES
Chief Complaint   Patient presents with    Back Pain         PMH  Pt last seen in the clinic 7 months ago for low back pain. She had lumbar MBB with no relief. Offered LESI and she declined. She complained that neck pain was more bothersome. Cervical MBB and imaging ordered for neck. She canceled the procedure. Imaging completed in May. Today, she states she is still having neck pain that is affecting her daily activities. She would like to try injections. Pt is taking xarelto - will need clearance to hold this for cervical procedures. She has tried PT, topicals and muscle relaxants with no relief. Back Pain  This is a chronic problem. The current episode started more than 1 year ago. The problem occurs constantly. The problem is unchanged. The pain is present in the lumbar spine. The quality of the pain is described as aching and shooting. The pain radiates to the left foot. The pain is at a severity of 5/10. The pain is mild. The pain is The same all the time. The symptoms are aggravated by position, twisting, bending, lying down, sitting, standing and coughing. Stiffness is present In the morning. Associated symptoms include headaches, numbness and tingling. Pertinent negatives include no bladder incontinence, bowel incontinence, chest pain or fever. She has tried home exercises, bed rest and walking for the symptoms. The treatment provided no relief. Neck Pain   This is a chronic problem. The current episode started more than 1 year ago. The problem occurs constantly. The problem has been unchanged. The pain is present in the midline. The quality of the pain is described as aching (sharp). The pain is at a severity of 5/10. The pain is moderate. The symptoms are aggravated by position and twisting (laying down). Associated symptoms include headaches, numbness and tingling. Pertinent negatives include no chest pain or fever. The treatment provided mild relief.      Patient denies any new

## 2023-07-25 ENCOUNTER — TELEPHONE (OUTPATIENT)
Dept: FAMILY MEDICINE CLINIC | Age: 38
End: 2023-07-25

## 2023-07-25 DIAGNOSIS — D68.59 THROMBOPHILIA (HCC): ICD-10-CM

## 2023-07-25 DIAGNOSIS — D68.51 FACTOR V LEIDEN MUTATION (HCC): Primary | ICD-10-CM

## 2023-07-25 RX ORDER — ENOXAPARIN SODIUM 100 MG/ML
100 INJECTION SUBCUTANEOUS DAILY
Qty: 4 ML | Refills: 0 | Status: SHIPPED | OUTPATIENT
Start: 2023-07-25 | End: 2023-07-29

## 2023-07-25 NOTE — TELEPHONE ENCOUNTER
Patient was notified of Lovenox being sent to pharmacy.  She states she already knows the instructions of when to stop Xarelto and when to start the Lovenox

## 2023-07-25 NOTE — TELEPHONE ENCOUNTER
Geeta-op anticoagulation management  Procedure date: TBD  Long term anticoagulant: Xarelto  Indication for anticoagulation: hypercoagulable state    Last dose of oral anticoagulant (Xarelto): 4 days prior to procedure date  Start date for lovenox: 3 days prior to procedure date  Hold lovenox on procedure date   Resume lovenox after procedure on same day  Resume oral anticoagulant on day after procedure      Lovenox prescription sent to pharmacy. Please notify patient.

## 2024-01-18 ENCOUNTER — OFFICE VISIT (OUTPATIENT)
Dept: OBGYN CLINIC | Age: 39
End: 2024-01-18
Payer: COMMERCIAL

## 2024-01-18 ENCOUNTER — HOSPITAL ENCOUNTER (OUTPATIENT)
Age: 39
Setting detail: SPECIMEN
Discharge: HOME | End: 2024-01-18

## 2024-01-18 VITALS
HEART RATE: 95 BPM | BODY MASS INDEX: 35.2 KG/M2 | HEIGHT: 66 IN | WEIGHT: 219 LBS | SYSTOLIC BLOOD PRESSURE: 116 MMHG | DIASTOLIC BLOOD PRESSURE: 78 MMHG

## 2024-01-18 DIAGNOSIS — N92.6 IRREGULAR MENSES: ICD-10-CM

## 2024-01-18 DIAGNOSIS — Z32.02 NEGATIVE PREGNANCY TEST: ICD-10-CM

## 2024-01-18 DIAGNOSIS — Z01.419 PAP SMEAR, AS PART OF ROUTINE GYNECOLOGICAL EXAMINATION: Primary | ICD-10-CM

## 2024-01-18 DIAGNOSIS — R10.2 PELVIC PAIN: ICD-10-CM

## 2024-01-18 DIAGNOSIS — Z11.51 SCREENING FOR HUMAN PAPILLOMAVIRUS: ICD-10-CM

## 2024-01-18 DIAGNOSIS — N76.0 ACUTE VAGINITIS: ICD-10-CM

## 2024-01-18 DIAGNOSIS — Z12.31 SCREENING MAMMOGRAM FOR BREAST CANCER: ICD-10-CM

## 2024-01-18 LAB
CONTROL: NORMAL
PREGNANCY TEST URINE, POC: NEGATIVE

## 2024-01-18 PROCEDURE — G8484 FLU IMMUNIZE NO ADMIN: HCPCS | Performed by: CLINICAL NURSE SPECIALIST

## 2024-01-18 PROCEDURE — 99386 PREV VISIT NEW AGE 40-64: CPT | Performed by: CLINICAL NURSE SPECIALIST

## 2024-01-18 PROCEDURE — 81025 URINE PREGNANCY TEST: CPT | Performed by: CLINICAL NURSE SPECIALIST

## 2024-01-18 RX ORDER — METRONIDAZOLE 500 MG/1
500 TABLET ORAL 2 TIMES DAILY
Qty: 14 TABLET | Refills: 0 | Status: SHIPPED | OUTPATIENT
Start: 2024-01-18 | End: 2024-01-25

## 2024-01-18 RX ORDER — FLUCONAZOLE 100 MG/1
100 TABLET ORAL DAILY
Qty: 7 TABLET | Refills: 0 | Status: SHIPPED | OUTPATIENT
Start: 2024-01-18 | End: 2024-01-25

## 2024-01-18 RX ORDER — METRONIDAZOLE 7.5 MG/G
GEL VAGINAL
Qty: 1 EACH | Refills: 6 | Status: SHIPPED | OUTPATIENT
Start: 2024-01-18

## 2024-01-18 ASSESSMENT — PATIENT HEALTH QUESTIONNAIRE - PHQ9
4. FEELING TIRED OR HAVING LITTLE ENERGY: 3
SUM OF ALL RESPONSES TO PHQ9 QUESTIONS 1 & 2: 4
6. FEELING BAD ABOUT YOURSELF - OR THAT YOU ARE A FAILURE OR HAVE LET YOURSELF OR YOUR FAMILY DOWN: 1
3. TROUBLE FALLING OR STAYING ASLEEP: 3
9. THOUGHTS THAT YOU WOULD BE BETTER OFF DEAD, OR OF HURTING YOURSELF: 0
8. MOVING OR SPEAKING SO SLOWLY THAT OTHER PEOPLE COULD HAVE NOTICED. OR THE OPPOSITE, BEING SO FIGETY OR RESTLESS THAT YOU HAVE BEEN MOVING AROUND A LOT MORE THAN USUAL: 1
SUM OF ALL RESPONSES TO PHQ QUESTIONS 1-9: 14
5. POOR APPETITE OR OVEREATING: 1
2. FEELING DOWN, DEPRESSED OR HOPELESS: 1
7. TROUBLE CONCENTRATING ON THINGS, SUCH AS READING THE NEWSPAPER OR WATCHING TELEVISION: 1
SUM OF ALL RESPONSES TO PHQ QUESTIONS 1-9: 14
1. LITTLE INTEREST OR PLEASURE IN DOING THINGS: 3
10. IF YOU CHECKED OFF ANY PROBLEMS, HOW DIFFICULT HAVE THESE PROBLEMS MADE IT FOR YOU TO DO YOUR WORK, TAKE CARE OF THINGS AT HOME, OR GET ALONG WITH OTHER PEOPLE: 1

## 2024-01-18 NOTE — PROGRESS NOTES
[facility], [facility] OBSTETRICS & GYNECOLOGY  [address]  [place] [address]  Dept: [phone]        DATE OF VISIT:  24        History and Physical    Carolynn Perez    :  1985  CHIEF COMPLAINT:    Chief Complaint   Patient presents with    Annual Exam                    Carolynn Perez is a 38 y.o. female who presents for annual well woman exam.  Patient reports that she has not had a cycle since 2023.  Patient also reports that she has been having pelvic pain for the past 2 months.  Patient describes the pain as someone taking a knife and stabbing her intermitently, nothing makes it better or worse.  She has not used any medications to help with the pain, she just breaths through until it subsides.  Patient has a positive depression screen today and has been on meds but is out and she missed her appt with PCP.      The patient was seen and examined.  Per the patient bowels are regular. She has no voiding complaints.  She denies any bloating as well as vaginal discharge.    Chaperone for Intimate Exam  Chaperone was offered as part of the rooming process. Patient declined and agrees to continue with exam without a chaperone.  Chaperone: none     _____________________________________________________________________  Past Medical History:   Diagnosis Date    Depression     Overactive bladder     Seizures ([facility])     Trauma                                                                    Past Surgical History:   Procedure Laterality Date    ABLATION OF DYSRHYTHMIC FOCUS  2013    CHOLECYSTECTOMY  2011    IVC FILTER INSERTION      GREEN FIELD FILITER - LOT NUMBER - [id number].    TUBAL LIGATION  2009     Family History   Problem Relation Age of Onset    Diabetes Paternal Grandfather     Diabetes Father     Hypertension Father     Ovarian Cancer Mother     Stroke Mother     Cancer Mother     Cancer Brother      Social

## 2024-01-19 LAB
C TRACH DNA SPEC QL PROBE+SIG AMP: NEGATIVE
CANDIDA SPECIES: NEGATIVE
GARDNERELLA VAGINALIS: POSITIVE
N GONORRHOEA DNA SPEC QL PROBE+SIG AMP: NEGATIVE
SOURCE: ABNORMAL
SPECIMEN DESCRIPTION: NORMAL
TRICHOMONAS: NEGATIVE

## 2024-01-20 LAB
HPV I/H RISK 4 DNA CVX QL NAA+PROBE: NOT DETECTED
HPV SAMPLE: NORMAL
HPV, INTERPRETATION: NORMAL
HPV16 DNA CVX QL NAA+PROBE: NOT DETECTED
HPV18 DNA CVX QL NAA+PROBE: NOT DETECTED
SPECIMEN DESCRIPTION: NORMAL

## 2024-01-22 ENCOUNTER — TELEPHONE (OUTPATIENT)
Dept: OBGYN CLINIC | Age: 39
End: 2024-01-22

## 2024-01-22 NOTE — TELEPHONE ENCOUNTER
----- Message from NIKOLE Gaffney - CNP sent at 1/19/2024 11:42 AM EST -----  Please let the patient know that she has BV and she was sent metrogel at her visit yesterday

## 2024-01-23 ENCOUNTER — TELEPHONE (OUTPATIENT)
Dept: PAIN MANAGEMENT | Age: 39
End: 2024-01-23

## 2024-01-24 ENCOUNTER — APPOINTMENT (OUTPATIENT)
Dept: CT IMAGING | Age: 39
End: 2024-01-24
Payer: COMMERCIAL

## 2024-01-24 ENCOUNTER — HOSPITAL ENCOUNTER (EMERGENCY)
Age: 39
Discharge: HOME | End: 2024-01-24
Attending: EMERGENCY MEDICINE
Payer: COMMERCIAL

## 2024-01-24 VITALS
RESPIRATION RATE: 20 BRPM | DIASTOLIC BLOOD PRESSURE: 56 MMHG | BODY MASS INDEX: 35.36 KG/M2 | TEMPERATURE: 98.3 F | WEIGHT: 220 LBS | HEART RATE: 93 BPM | OXYGEN SATURATION: 100 % | HEIGHT: 66 IN | SYSTOLIC BLOOD PRESSURE: 100 MMHG

## 2024-01-24 DIAGNOSIS — M62.838 NECK MUSCLE SPASM: ICD-10-CM

## 2024-01-24 DIAGNOSIS — S16.1XXA ACUTE STRAIN OF NECK MUSCLE, INITIAL ENCOUNTER: Primary | ICD-10-CM

## 2024-01-24 PROCEDURE — 6360000002 HC RX W HCPCS: Performed by: PHYSICIAN ASSISTANT

## 2024-01-24 PROCEDURE — 99284 EMERGENCY DEPT VISIT MOD MDM: CPT

## 2024-01-24 PROCEDURE — 72125 CT NECK SPINE W/O DYE: CPT

## 2024-01-24 PROCEDURE — 6370000000 HC RX 637 (ALT 250 FOR IP): Performed by: PHYSICIAN ASSISTANT

## 2024-01-24 RX ORDER — LIDOCAINE 4 G/G
1 PATCH TOPICAL ONCE
Status: DISCONTINUED | OUTPATIENT
Start: 2024-01-24 | End: 2024-01-24 | Stop reason: HOSPADM

## 2024-01-24 RX ORDER — KETOROLAC TROMETHAMINE 30 MG/ML
30 INJECTION, SOLUTION INTRAMUSCULAR; INTRAVENOUS ONCE
Status: COMPLETED | OUTPATIENT
Start: 2024-01-24 | End: 2024-01-24

## 2024-01-24 RX ORDER — CYCLOBENZAPRINE HCL 10 MG
10 TABLET ORAL 2 TIMES DAILY PRN
Qty: 10 TABLET | Refills: 0 | Status: SHIPPED | OUTPATIENT
Start: 2024-01-24 | End: 2024-01-29

## 2024-01-24 RX ORDER — ORPHENADRINE CITRATE 30 MG/ML
60 INJECTION INTRAMUSCULAR; INTRAVENOUS ONCE
Status: COMPLETED | OUTPATIENT
Start: 2024-01-24 | End: 2024-01-24

## 2024-01-24 RX ORDER — IBUPROFEN 800 MG/1
800 TABLET ORAL EVERY 8 HOURS PRN
Qty: 20 TABLET | Refills: 0 | Status: SHIPPED | OUTPATIENT
Start: 2024-01-24 | End: 2024-01-25

## 2024-01-24 RX ADMIN — KETOROLAC TROMETHAMINE 30 MG: 30 INJECTION, SOLUTION INTRAMUSCULAR; INTRAVENOUS at 16:23

## 2024-01-24 RX ADMIN — ORPHENADRINE CITRATE 60 MG: 30 INJECTION INTRAMUSCULAR; INTRAVENOUS at 16:23

## 2024-01-24 ASSESSMENT — LIFESTYLE VARIABLES
HOW MANY STANDARD DRINKS CONTAINING ALCOHOL DO YOU HAVE ON A TYPICAL DAY: PATIENT DOES NOT DRINK
HOW OFTEN DO YOU HAVE A DRINK CONTAINING ALCOHOL: NEVER

## 2024-01-24 ASSESSMENT — PAIN DESCRIPTION - ORIENTATION: ORIENTATION: LEFT

## 2024-01-24 ASSESSMENT — PAIN SCALES - GENERAL
PAINLEVEL_OUTOF10: 10
PAINLEVEL_OUTOF10: 10

## 2024-01-24 ASSESSMENT — PAIN DESCRIPTION - LOCATION: LOCATION: NECK

## 2024-01-24 ASSESSMENT — PAIN DESCRIPTION - PAIN TYPE: TYPE: ACUTE PAIN;CHRONIC PAIN

## 2024-01-24 ASSESSMENT — PAIN - FUNCTIONAL ASSESSMENT
PAIN_FUNCTIONAL_ASSESSMENT: 0-10
PAIN_FUNCTIONAL_ASSESSMENT: PREVENTS OR INTERFERES SOME ACTIVE ACTIVITIES AND ADLS

## 2024-01-24 ASSESSMENT — PAIN DESCRIPTION - DESCRIPTORS: DESCRIPTORS: STABBING

## 2024-01-24 NOTE — ED PROVIDER NOTES
[facility] ED  eMERGENCY dEPARTMENT eNCOUnter   Independent Attestation     Pt Name: Carolynn Perez  MRN: [id number]  Birthdate 1985  Date of evaluation: 1/24/24       Carolynn Perez is a 38 y.o. female who presents with Neck Pain        Based on the medical record, the care appears appropriate. I was personally available for consultation in the Emergency Department.    Amari Chang MD  Attending Emergency  Physician               Amari Chang MD  01/24/24 [id number]

## 2024-01-24 NOTE — ED TRIAGE NOTES
Mode of arrival (squad #, walk in, police, etc) : walkin        Chief complaint(s): neck pain        Arrival Note (brief scenario, treatment PTA, etc).: patient has chronic neck pain, pain has increased on the L side within the last day, patient has tried tylenol and her TENS unit with no relief.        C= \"Have you ever felt that you should Cut down on your drinking?\"  No  A= \"Have people Annoyed you by criticizing your drinking?\"  No  G= \"Have you ever felt bad or Guilty about your drinking?\"  No  E= \"Have you ever had a drink as an Eye-opener first thing in the morning to steady your nerves or to help a hangover?\"  No      Deferred []      Reason for deferring: N/A    *If yes to two or more: probable alcohol abuse.*

## 2024-01-24 NOTE — DISCHARGE INSTRUCTIONS
Recommend close follow up with your PCP and pain management.  Recommend heating pads.  Return to the ED if you develop worsening pain, headaches, dizziness, passing out, vision changes, chest pain, shortness of breath, numbness or any other concerning symptoms.  Do not operate machinery or drink alcohol while taking muscle relaxer.
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
## 2024-01-25 ENCOUNTER — OFFICE VISIT (OUTPATIENT)
Dept: FAMILY MEDICINE CLINIC | Age: 39
End: 2024-01-25
Payer: COMMERCIAL

## 2024-01-25 VITALS
DIASTOLIC BLOOD PRESSURE: 60 MMHG | TEMPERATURE: 97.4 F | HEART RATE: 80 BPM | BODY MASS INDEX: 35.86 KG/M2 | SYSTOLIC BLOOD PRESSURE: 104 MMHG | OXYGEN SATURATION: 98 % | WEIGHT: 222.2 LBS

## 2024-01-25 DIAGNOSIS — D68.59 THROMBOPHILIA (HCC): ICD-10-CM

## 2024-01-25 DIAGNOSIS — L30.8 OTHER ECZEMA: ICD-10-CM

## 2024-01-25 DIAGNOSIS — M54.2 NECK PAIN: ICD-10-CM

## 2024-01-25 DIAGNOSIS — E78.00 PURE HYPERCHOLESTEROLEMIA: ICD-10-CM

## 2024-01-25 DIAGNOSIS — Z13.0 SCREENING, ANEMIA, DEFICIENCY, IRON: ICD-10-CM

## 2024-01-25 DIAGNOSIS — D68.51 FACTOR V LEIDEN MUTATION (HCC): ICD-10-CM

## 2024-01-25 DIAGNOSIS — K21.9 GASTROESOPHAGEAL REFLUX DISEASE, UNSPECIFIED WHETHER ESOPHAGITIS PRESENT: ICD-10-CM

## 2024-01-25 DIAGNOSIS — F31.32 BIPOLAR AFFECTIVE DISORDER, CURRENTLY DEPRESSED, MODERATE (HCC): Primary | ICD-10-CM

## 2024-01-25 DIAGNOSIS — Z13.29 SCREENING FOR THYROID DISORDER: ICD-10-CM

## 2024-01-25 DIAGNOSIS — J44.9 CHRONIC OBSTRUCTIVE PULMONARY DISEASE, UNSPECIFIED COPD TYPE (HCC): ICD-10-CM

## 2024-01-25 PROCEDURE — G8417 CALC BMI ABV UP PARAM F/U: HCPCS | Performed by: INTERNAL MEDICINE

## 2024-01-25 PROCEDURE — G8427 DOCREV CUR MEDS BY ELIG CLIN: HCPCS | Performed by: INTERNAL MEDICINE

## 2024-01-25 PROCEDURE — G8484 FLU IMMUNIZE NO ADMIN: HCPCS | Performed by: INTERNAL MEDICINE

## 2024-01-25 PROCEDURE — 3023F SPIROM DOC REV: CPT | Performed by: INTERNAL MEDICINE

## 2024-01-25 PROCEDURE — 4004F PT TOBACCO SCREEN RCVD TLK: CPT | Performed by: INTERNAL MEDICINE

## 2024-01-25 PROCEDURE — 99214 OFFICE O/P EST MOD 30 MIN: CPT | Performed by: INTERNAL MEDICINE

## 2024-01-25 RX ORDER — ALBUTEROL SULFATE 90 UG/1
AEROSOL, METERED RESPIRATORY (INHALATION)
Qty: 1 EACH | Refills: 5 | Status: SHIPPED | OUTPATIENT
Start: 2024-01-25

## 2024-01-25 RX ORDER — FAMOTIDINE 20 MG/1
20 TABLET, FILM COATED ORAL NIGHTLY
Qty: 30 TABLET | Refills: 3 | Status: SHIPPED | OUTPATIENT
Start: 2024-01-25

## 2024-01-25 RX ORDER — MIRTAZAPINE 30 MG/1
15 TABLET, FILM COATED ORAL NIGHTLY
Qty: 30 TABLET | Refills: 3 | Status: SHIPPED | OUTPATIENT
Start: 2024-01-25

## 2024-01-25 RX ORDER — METHYLPREDNISOLONE 4 MG/1
TABLET ORAL
Qty: 1 KIT | Refills: 0 | Status: SHIPPED | OUTPATIENT
Start: 2024-01-25

## 2024-01-25 RX ORDER — DIVALPROEX SODIUM 500 MG/1
500 TABLET, EXTENDED RELEASE ORAL DAILY
Qty: 30 TABLET | Refills: 3 | Status: SHIPPED | OUTPATIENT
Start: 2024-01-25

## 2024-01-25 SDOH — ECONOMIC STABILITY: FOOD INSECURITY: WITHIN THE PAST 12 MONTHS, THE FOOD YOU BOUGHT JUST DIDN'T LAST AND YOU DIDN'T HAVE MONEY TO GET MORE.: NEVER TRUE

## 2024-01-25 SDOH — ECONOMIC STABILITY: FOOD INSECURITY: WITHIN THE PAST 12 MONTHS, YOU WORRIED THAT YOUR FOOD WOULD RUN OUT BEFORE YOU GOT MONEY TO BUY MORE.: NEVER TRUE

## 2024-01-25 SDOH — ECONOMIC STABILITY: INCOME INSECURITY: HOW HARD IS IT FOR YOU TO PAY FOR THE VERY BASICS LIKE FOOD, HOUSING, MEDICAL CARE, AND HEATING?: VERY HARD

## 2024-01-25 SDOH — ECONOMIC STABILITY: HOUSING INSECURITY
IN THE LAST 12 MONTHS, WAS THERE A TIME WHEN YOU DID NOT HAVE A STEADY PLACE TO SLEEP OR SLEPT IN A SHELTER (INCLUDING NOW)?: NO

## 2024-01-25 ASSESSMENT — VISUAL ACUITY: OU: 1

## 2024-01-25 NOTE — PROGRESS NOTES
[facility]  [facility]  [address] [id number]  Dept: [phone]  Dept Fax: [phone]      Carolynn Perez is a 38 y.o. female who presents today for hermedical conditions/complaints as noted below.  Carolynn Perez is c/o of Mass (Front RT side of neck, noticed it about 2 weeks ago, not painful, can swallow fine, rash in same area, rash started back in 09/2023, itchy, ) and Gastroesophageal Reflux (Pt think she has, would like a script called to help with it )        Assessment/Plan:     1. Bipolar affective disorder, currently depressed, moderate ([facility])  -     divalproex (DEPAKOTE ER) 500 MG extended release tablet; Take 1 tablet by mouth daily, Disp-30 tablet, R-3Normal  -     mirtazapine (REMERON) 30 MG tablet; Take 0.5 tablets by mouth nightly, Disp-30 tablet, R-3Normal  2. Thrombophilia ([facility])  3. Chronic obstructive pulmonary disease, unspecified COPD type ([facility])  -     umeclidinium-vilanterol (ANORO ELLIPTA) 62.5-25 MCG/ACT inhaler; Inhale 1 puff into the lungs daily, Disp-1 each, R-5Normal  -     albuterol sulfate HFA (PROVENTIL;VENTOLIN;PROAIR) 108 (90 Base) MCG/ACT inhaler; inhale 2 puffs by mouth every 4 hours if needed for wheezing, Disp-1 each, R-5Normal  4. Factor V Leiden mutation ([facility])  -     rivaroxaban (XARELTO) 20 MG TABS tablet; Take 1 tablet by mouth daily (with breakfast), Disp-90 tablet, R-1Normal  5. Pure hypercholesterolemia  -     Lipid, Fasting; Future  -     Comprehensive Metabolic Panel; Future  6. Neck pain  -     methylPREDNISolone (MEDROL DOSEPACK) 4 MG tablet; Take by mouth., Disp-1 kit, R-0Normal  7. Screening, anemia, deficiency, iron  -     CBC with Auto Differential; Future  8. Screening for thyroid disorder  -     TSH With Reflex Ft4; Future  9. Other eczema  -     hydrocortisone 2.5 % ointment; Apply topically 2 times daily., Disp-100 g, R-1, Normal  10. Gastroesophageal reflux disease, unspecified whether esophagitis

## 2024-01-29 ENCOUNTER — OFFICE VISIT (OUTPATIENT)
Dept: PAIN MANAGEMENT | Age: 39
End: 2024-01-29
Payer: COMMERCIAL

## 2024-01-29 VITALS — WEIGHT: 222 LBS | HEIGHT: 66 IN | BODY MASS INDEX: 35.68 KG/M2

## 2024-01-29 DIAGNOSIS — M54.2 CERVICALGIA: ICD-10-CM

## 2024-01-29 DIAGNOSIS — M54.50 CHRONIC LOW BACK PAIN, UNSPECIFIED BACK PAIN LATERALITY, UNSPECIFIED WHETHER SCIATICA PRESENT: ICD-10-CM

## 2024-01-29 DIAGNOSIS — G89.29 CHRONIC LOW BACK PAIN, UNSPECIFIED BACK PAIN LATERALITY, UNSPECIFIED WHETHER SCIATICA PRESENT: ICD-10-CM

## 2024-01-29 DIAGNOSIS — M47.817 LUMBOSACRAL SPONDYLOSIS WITHOUT MYELOPATHY: Primary | ICD-10-CM

## 2024-01-29 DIAGNOSIS — M47.812 CERVICAL SPONDYLOSIS WITHOUT MYELOPATHY: ICD-10-CM

## 2024-01-29 PROCEDURE — G8484 FLU IMMUNIZE NO ADMIN: HCPCS | Performed by: NURSE PRACTITIONER

## 2024-01-29 PROCEDURE — G8417 CALC BMI ABV UP PARAM F/U: HCPCS | Performed by: NURSE PRACTITIONER

## 2024-01-29 PROCEDURE — 99214 OFFICE O/P EST MOD 30 MIN: CPT | Performed by: NURSE PRACTITIONER

## 2024-01-29 PROCEDURE — 4004F PT TOBACCO SCREEN RCVD TLK: CPT | Performed by: NURSE PRACTITIONER

## 2024-01-29 PROCEDURE — G8427 DOCREV CUR MEDS BY ELIG CLIN: HCPCS | Performed by: NURSE PRACTITIONER

## 2024-01-29 ASSESSMENT — ENCOUNTER SYMPTOMS
BACK PAIN: 1
SHORTNESS OF BREATH: 0
CONSTIPATION: 0
COUGH: 0

## 2024-01-29 NOTE — PROGRESS NOTES
Chief Complaint   Patient presents with    Back Pain       PMH  Pt complains of low back and neck pain. Recent ER visit 1/24/24 for neck pain. States she had a sharp pain when she was watching TV and went to ER. CT cervical spine obtained with no acute abnormality of the cervical spine. She is also having low back pain that is worsening and affecting ADL. Lumbar MRI 2022 with mild degenerative changes.     Pt last seen in the clinic 6 months ago for low back pain. She had lumbar MBB 12/1/2022 with no relief. Offered LESI and she declined. She complained that neck pain was more bothersome at that time. Cervical MBB and imaging ordered for neck. She canceled the procedure. Imaging completed in May 2022 with mild degenerative changes.     Pt is taking xarelto - would need clearance to hold this for cervical procedures.   She has tried  topicals and muscle relaxants with no relief.     She complains of pain in both shins with prolonged walking.     Back Pain  This is a chronic problem. The current episode started more than 1 year ago. The problem occurs constantly. The problem is unchanged. The pain is present in the gluteal, lumbar spine and sacro-iliac. The quality of the pain is described as shooting. The pain does not radiate. The pain is at a severity of 9/10. The pain is severe. The pain is The same all the time. The symptoms are aggravated by standing, position, sitting and lying down. Stiffness is present All day. Pertinent negatives include no chest pain or fever. She has tried heat, ice, muscle relaxant, NSAIDs and walking for the symptoms. The treatment provided no relief.       Patient denies any new neurological symptoms. No bowel or bladder incontinence, no weakness, and no falling.      Past Medical History:   Diagnosis Date    Depression     Overactive bladder     Seizures ([facility])     Trauma        Past Surgical History:   Procedure Laterality Date    ABLATION OF DYSRHYTHMIC FOCUS  2013

## 2024-01-31 ASSESSMENT — ENCOUNTER SYMPTOMS
WHEEZING: 0
NAUSEA: 0
COUGH: 0
BLOOD IN STOOL: 0
DIARRHEA: 0
CHEST TIGHTNESS: 0
CHOKING: 0
VOMITING: 0
ABDOMINAL PAIN: 0
SHORTNESS OF BREATH: 0
CONSTIPATION: 0
ANAL BLEEDING: 0

## 2024-01-31 ASSESSMENT — VISUAL ACUITY: OU: 1

## 2024-02-07 LAB — CYTOLOGY REPORT: NORMAL

## 2024-04-19 ENCOUNTER — TELEPHONE (OUTPATIENT)
Dept: FAMILY MEDICINE CLINIC | Age: 39
End: 2024-04-19

## 2024-04-19 NOTE — TELEPHONE ENCOUNTER
Called the patient, unable to leave a VM.     Patient requested SDOH resources on transportation and Finances at her last appt on 1/25/24.     If patient calls back and is still agreeable- please addend the 1/25/24 visit and add the resources along with the SDOH referral.

## 2024-08-26 ENCOUNTER — TELEPHONE (OUTPATIENT)
Dept: FAMILY MEDICINE CLINIC | Age: 39
End: 2024-08-26

## 2024-08-26 NOTE — TELEPHONE ENCOUNTER
Patient contacted office to schedule an appointment for ER follow up. She is requesting Paxlovid to be called in for her symptoms.     Stated her skin feels hot, stuffy nose, stomach cramping, and sore throat.    Mitzi crisostomo [place]     ER note from 8/25/24 states symtoms started 8/22 afternoon

## 2024-08-27 NOTE — TELEPHONE ENCOUNTER
Patient was notified of Paxlovid being sent and to hold  Xarlto while taking Paxlovid Patient states she was given 3 days off of work. She does not feel well enough to go back to work yet. She works at [employer] Sunday through Wednesday. She is requesting a work note to return on 09/02/24.  Please advise

## 2024-08-27 NOTE — TELEPHONE ENCOUNTER
Letter has been written and Carolynn was notified.  The letter was faxed to    [employer]    [phone]

## 2025-01-08 ENCOUNTER — TELEPHONE (OUTPATIENT)
Dept: FAMILY MEDICINE CLINIC | Age: 40
End: 2025-01-08

## 2025-01-08 NOTE — TELEPHONE ENCOUNTER
Patient had 3 no shows within one year span.  -4/25/24  -9/5/24  -1/8/25    Would you like to dismiss patient based on no show policy?

## 2025-02-05 ENCOUNTER — HOSPITAL ENCOUNTER (OUTPATIENT)
Age: 40
Setting detail: SPECIMEN
Discharge: HOME | End: 2025-02-05

## 2025-02-05 ENCOUNTER — OFFICE VISIT (OUTPATIENT)
Dept: OBGYN CLINIC | Age: 40
End: 2025-02-05
Payer: COMMERCIAL

## 2025-02-05 VITALS
DIASTOLIC BLOOD PRESSURE: 80 MMHG | HEIGHT: 66 IN | HEART RATE: 98 BPM | BODY MASS INDEX: 36.8 KG/M2 | WEIGHT: 229 LBS | SYSTOLIC BLOOD PRESSURE: 132 MMHG

## 2025-02-05 DIAGNOSIS — Z12.31 SCREENING MAMMOGRAM FOR BREAST CANCER: ICD-10-CM

## 2025-02-05 DIAGNOSIS — R39.15 URINARY URGENCY: ICD-10-CM

## 2025-02-05 DIAGNOSIS — Z76.89 ENCOUNTER TO ESTABLISH CARE: ICD-10-CM

## 2025-02-05 DIAGNOSIS — F17.200 SMOKER: ICD-10-CM

## 2025-02-05 DIAGNOSIS — N89.8 VAGINAL DISCHARGE: ICD-10-CM

## 2025-02-05 DIAGNOSIS — Z01.419 WELL WOMAN EXAM: Primary | ICD-10-CM

## 2025-02-05 LAB
BILIRUBIN, POC: NORMAL
BLOOD URINE, POC: NORMAL
CLARITY, POC: CLEAR
COLOR, POC: YELLOW
GLUCOSE URINE, POC: NORMAL MG/DL
KETONES, POC: NORMAL MG/DL
LEUKOCYTE EST, POC: NORMAL
NITRITE, POC: NORMAL
PH, POC: 5
PROTEIN, POC: NORMAL MG/DL
SPECIFIC GRAVITY, POC: 1.02
UROBILINOGEN, POC: NORMAL MG/DL

## 2025-02-05 PROCEDURE — 4004F PT TOBACCO SCREEN RCVD TLK: CPT | Performed by: CLINICAL NURSE SPECIALIST

## 2025-02-05 PROCEDURE — 81003 URINALYSIS AUTO W/O SCOPE: CPT | Performed by: CLINICAL NURSE SPECIALIST

## 2025-02-05 PROCEDURE — 99396 PREV VISIT EST AGE 40-64: CPT | Performed by: CLINICAL NURSE SPECIALIST

## 2025-02-05 PROCEDURE — G8427 DOCREV CUR MEDS BY ELIG CLIN: HCPCS | Performed by: CLINICAL NURSE SPECIALIST

## 2025-02-05 PROCEDURE — G8417 CALC BMI ABV UP PARAM F/U: HCPCS | Performed by: CLINICAL NURSE SPECIALIST

## 2025-02-05 PROCEDURE — 99213 OFFICE O/P EST LOW 20 MIN: CPT | Performed by: CLINICAL NURSE SPECIALIST

## 2025-02-05 SDOH — ECONOMIC STABILITY: FOOD INSECURITY: WITHIN THE PAST 12 MONTHS, THE FOOD YOU BOUGHT JUST DIDN'T LAST AND YOU DIDN'T HAVE MONEY TO GET MORE.: NEVER TRUE

## 2025-02-05 SDOH — ECONOMIC STABILITY: FOOD INSECURITY: WITHIN THE PAST 12 MONTHS, YOU WORRIED THAT YOUR FOOD WOULD RUN OUT BEFORE YOU GOT MONEY TO BUY MORE.: NEVER TRUE

## 2025-02-05 ASSESSMENT — PATIENT HEALTH QUESTIONNAIRE - PHQ9
8. MOVING OR SPEAKING SO SLOWLY THAT OTHER PEOPLE COULD HAVE NOTICED. OR THE OPPOSITE, BEING SO FIGETY OR RESTLESS THAT YOU HAVE BEEN MOVING AROUND A LOT MORE THAN USUAL: MORE THAN HALF THE DAYS
3. TROUBLE FALLING OR STAYING ASLEEP: NEARLY EVERY DAY
4. FEELING TIRED OR HAVING LITTLE ENERGY: NEARLY EVERY DAY
5. POOR APPETITE OR OVEREATING: NOT AT ALL
SUM OF ALL RESPONSES TO PHQ QUESTIONS 1-9: 8
6. FEELING BAD ABOUT YOURSELF - OR THAT YOU ARE A FAILURE OR HAVE LET YOURSELF OR YOUR FAMILY DOWN: NOT AT ALL
SUM OF ALL RESPONSES TO PHQ QUESTIONS 1-9: 8
7. TROUBLE CONCENTRATING ON THINGS, SUCH AS READING THE NEWSPAPER OR WATCHING TELEVISION: NOT AT ALL
2. FEELING DOWN, DEPRESSED OR HOPELESS: NOT AT ALL
SUM OF ALL RESPONSES TO PHQ QUESTIONS 1-9: 8
10. IF YOU CHECKED OFF ANY PROBLEMS, HOW DIFFICULT HAVE THESE PROBLEMS MADE IT FOR YOU TO DO YOUR WORK, TAKE CARE OF THINGS AT HOME, OR GET ALONG WITH OTHER PEOPLE: SOMEWHAT DIFFICULT
SUM OF ALL RESPONSES TO PHQ QUESTIONS 1-9: 8
SUM OF ALL RESPONSES TO PHQ9 QUESTIONS 1 & 2: 0
9. THOUGHTS THAT YOU WOULD BE BETTER OFF DEAD, OR OF HURTING YOURSELF: NOT AT ALL
1. LITTLE INTEREST OR PLEASURE IN DOING THINGS: NOT AT ALL

## 2025-02-05 NOTE — PROGRESS NOTES
annual well woman exam with vaginitis    39 y.o.  Female; Annual   Diagnosis Orders   1. Well woman exam        2. Urinary urgency  POCT Urinalysis No Micro (Auto)      3. Screening mammogram for breast cancer  DK DIGITAL SCREEN W OR WO CAD BILATERAL      4. Encounter to establish care  Yasmani Levy CNP, [place], [place]      5. Smoker        6. Vaginal discharge  Vaginitis DNA Probe                      PLAN:  - Discussed new papsmear guidelines.   - Birth control Discussed.  - Smoking risk factors Discussed  - Diet and exercise reviewed.   - Routine healthmaintenance per patients PCP.  - Return to clinic in 1 year or earlier with questions, problems, concerns.  Return for 1 year for Annual and as needed.    20 minutes excluding preventing care     Electronically signed by NIKOLE Gaffney - CNP on 2/5/2025 at 8:49 AM

## 2025-02-06 ENCOUNTER — TELEPHONE (OUTPATIENT)
Dept: OBGYN CLINIC | Age: 40
End: 2025-02-06

## 2025-02-06 DIAGNOSIS — N76.0 BV (BACTERIAL VAGINOSIS): Primary | ICD-10-CM

## 2025-02-06 DIAGNOSIS — B96.89 BV (BACTERIAL VAGINOSIS): Primary | ICD-10-CM

## 2025-02-06 LAB
CANDIDA SPECIES: NEGATIVE
GARDNERELLA VAGINALIS: POSITIVE
SOURCE: ABNORMAL
TRICHOMONAS: NEGATIVE

## 2025-02-06 RX ORDER — METRONIDAZOLE 500 MG/1
500 TABLET ORAL 2 TIMES DAILY
Qty: 14 TABLET | Refills: 0 | Status: SHIPPED | OUTPATIENT
Start: 2025-02-06 | End: 2025-02-13

## 2025-02-06 NOTE — TELEPHONE ENCOUNTER
----- Message from NIKOLE Waite - CNP sent at 2/6/2025 10:20 AM EST -----  Please let the patient know that she has BV and I sent flagyl

## 2025-02-11 ENCOUNTER — TELEPHONE (OUTPATIENT)
Dept: OBGYN CLINIC | Age: 40
End: 2025-02-11

## 2025-08-25 ENCOUNTER — OFFICE VISIT (OUTPATIENT)
Dept: FAMILY MEDICINE CLINIC | Age: 40
End: 2025-08-25
Payer: COMMERCIAL

## 2025-08-25 VITALS
SYSTOLIC BLOOD PRESSURE: 123 MMHG | HEIGHT: 66 IN | WEIGHT: 232 LBS | BODY MASS INDEX: 37.28 KG/M2 | HEART RATE: 72 BPM | DIASTOLIC BLOOD PRESSURE: 87 MMHG

## 2025-08-25 DIAGNOSIS — G89.29 CHRONIC PAIN OF BOTH KNEES: ICD-10-CM

## 2025-08-25 DIAGNOSIS — J44.9 CHRONIC OBSTRUCTIVE PULMONARY DISEASE, UNSPECIFIED COPD TYPE (HCC): ICD-10-CM

## 2025-08-25 DIAGNOSIS — Z76.89 ENCOUNTER TO ESTABLISH CARE: Primary | ICD-10-CM

## 2025-08-25 DIAGNOSIS — D68.59 THROMBOPHILIA: ICD-10-CM

## 2025-08-25 DIAGNOSIS — Z51.81 MEDICATION MONITORING ENCOUNTER: ICD-10-CM

## 2025-08-25 DIAGNOSIS — F31.60 BIPOLAR AFFECTIVE DISORDER, CURRENT EPISODE MIXED, CURRENT EPISODE SEVERITY UNSPECIFIED (HCC): ICD-10-CM

## 2025-08-25 DIAGNOSIS — M25.562 CHRONIC PAIN OF BOTH KNEES: ICD-10-CM

## 2025-08-25 DIAGNOSIS — E78.00 PURE HYPERCHOLESTEROLEMIA: ICD-10-CM

## 2025-08-25 DIAGNOSIS — L21.0 DANDRUFF: ICD-10-CM

## 2025-08-25 DIAGNOSIS — M25.561 CHRONIC PAIN OF BOTH KNEES: ICD-10-CM

## 2025-08-25 DIAGNOSIS — Z13.6 SCREENING FOR CARDIOVASCULAR CONDITION: ICD-10-CM

## 2025-08-25 DIAGNOSIS — B36.0 TINEA VERSICOLOR: ICD-10-CM

## 2025-08-25 DIAGNOSIS — M47.812 CERVICAL SPONDYLOSIS WITHOUT MYELOPATHY: ICD-10-CM

## 2025-08-25 DIAGNOSIS — D68.51 FACTOR V LEIDEN MUTATION: ICD-10-CM

## 2025-08-25 PROCEDURE — 3023F SPIROM DOC REV: CPT | Performed by: NURSE PRACTITIONER

## 2025-08-25 PROCEDURE — 99215 OFFICE O/P EST HI 40 MIN: CPT | Performed by: NURSE PRACTITIONER

## 2025-08-25 PROCEDURE — G8427 DOCREV CUR MEDS BY ELIG CLIN: HCPCS | Performed by: NURSE PRACTITIONER

## 2025-08-25 PROCEDURE — G8417 CALC BMI ABV UP PARAM F/U: HCPCS | Performed by: NURSE PRACTITIONER

## 2025-08-25 PROCEDURE — 4004F PT TOBACCO SCREEN RCVD TLK: CPT | Performed by: NURSE PRACTITIONER

## 2025-08-25 RX ORDER — ALBUTEROL SULFATE 90 UG/1
INHALANT RESPIRATORY (INHALATION)
Qty: 1 EACH | Refills: 5 | Status: SHIPPED | OUTPATIENT
Start: 2025-08-25

## 2025-08-25 RX ORDER — KETOCONAZOLE 20 MG/ML
SHAMPOO, SUSPENSION TOPICAL
Qty: 120 ML | Refills: 1 | Status: SHIPPED | OUTPATIENT
Start: 2025-08-25

## 2025-08-25 ASSESSMENT — PATIENT HEALTH QUESTIONNAIRE - PHQ9
10. IF YOU CHECKED OFF ANY PROBLEMS, HOW DIFFICULT HAVE THESE PROBLEMS MADE IT FOR YOU TO DO YOUR WORK, TAKE CARE OF THINGS AT HOME, OR GET ALONG WITH OTHER PEOPLE: VERY DIFFICULT
SUM OF ALL RESPONSES TO PHQ QUESTIONS 1-9: 21
3. TROUBLE FALLING OR STAYING ASLEEP: NEARLY EVERY DAY
9. THOUGHTS THAT YOU WOULD BE BETTER OFF DEAD, OR OF HURTING YOURSELF: NOT AT ALL
5. POOR APPETITE OR OVEREATING: NEARLY EVERY DAY
6. FEELING BAD ABOUT YOURSELF - OR THAT YOU ARE A FAILURE OR HAVE LET YOURSELF OR YOUR FAMILY DOWN: NEARLY EVERY DAY
2. FEELING DOWN, DEPRESSED OR HOPELESS: NEARLY EVERY DAY
SUM OF ALL RESPONSES TO PHQ QUESTIONS 1-9: 21
1. LITTLE INTEREST OR PLEASURE IN DOING THINGS: NEARLY EVERY DAY
8. MOVING OR SPEAKING SO SLOWLY THAT OTHER PEOPLE COULD HAVE NOTICED. OR THE OPPOSITE, BEING SO FIGETY OR RESTLESS THAT YOU HAVE BEEN MOVING AROUND A LOT MORE THAN USUAL: NOT AT ALL
4. FEELING TIRED OR HAVING LITTLE ENERGY: NEARLY EVERY DAY
7. TROUBLE CONCENTRATING ON THINGS, SUCH AS READING THE NEWSPAPER OR WATCHING TELEVISION: NEARLY EVERY DAY

## 2025-08-25 ASSESSMENT — COLUMBIA-SUICIDE SEVERITY RATING SCALE - C-SSRS
1. WITHIN THE PAST MONTH, HAVE YOU WISHED YOU WERE DEAD OR WISHED YOU COULD GO TO SLEEP AND NOT WAKE UP?: NO
7. DID THIS OCCUR IN THE LAST THREE MONTHS: NO
6. HAVE YOU EVER DONE ANYTHING, STARTED TO DO ANYTHING, OR PREPARED TO DO ANYTHING TO END YOUR LIFE?: YES
2. HAVE YOU ACTUALLY HAD ANY THOUGHTS OF KILLING YOURSELF?: NO

## 2025-08-25 ASSESSMENT — ENCOUNTER SYMPTOMS
SHORTNESS OF BREATH: 1
BACK PAIN: 1

## 2025-08-28 ENCOUNTER — HOSPITAL ENCOUNTER (OUTPATIENT)
Dept: GENERAL RADIOLOGY | Age: 40
Discharge: HOME | End: 2025-08-30
Payer: COMMERCIAL

## 2025-08-28 ENCOUNTER — HOSPITAL ENCOUNTER (OUTPATIENT)
Dept: LAB | Age: 40
Discharge: HOME | End: 2025-08-28
Payer: COMMERCIAL

## 2025-08-28 DIAGNOSIS — M25.561 CHRONIC PAIN OF BOTH KNEES: ICD-10-CM

## 2025-08-28 DIAGNOSIS — M25.562 CHRONIC PAIN OF BOTH KNEES: ICD-10-CM

## 2025-08-28 DIAGNOSIS — G89.29 CHRONIC PAIN OF BOTH KNEES: ICD-10-CM

## 2025-08-28 LAB
ALBUMIN SERPL-MCNC: 4.5 G/DL (ref 3.5–5.2)
ALBUMIN/GLOB SERPL: 1.8 {RATIO} (ref 1–2.5)
ALP SERPL-CCNC: 78 U/L (ref 35–104)
ALT SERPL-CCNC: 47 U/L (ref 10–35)
ANION GAP SERPL CALCULATED.3IONS-SCNC: 12 MMOL/L (ref 9–16)
AST SERPL-CCNC: 35 U/L (ref 10–35)
BILIRUB SERPL-MCNC: 0.3 MG/DL (ref 0–1.2)
BUN SERPL-MCNC: 16 MG/DL (ref 6–20)
CALCIUM SERPL-MCNC: 10.1 MG/DL (ref 8.6–10.4)
CHLORIDE SERPL-SCNC: 104 MMOL/L (ref 98–107)
CHOLEST SERPL-MCNC: 279 MG/DL (ref 0–199)
CHOLESTEROL/HDL RATIO: 4.5
CO2 SERPL-SCNC: 25 MMOL/L (ref 20–31)
CREAT SERPL-MCNC: 0.8 MG/DL (ref 0.6–0.9)
ERYTHROCYTE [DISTWIDTH] IN BLOOD BY AUTOMATED COUNT: 13.5 % (ref 11.8–14.4)
GFR, ESTIMATED: >90 ML/MIN/1.73M2
GLUCOSE SERPL-MCNC: 94 MG/DL (ref 74–99)
HCT VFR BLD AUTO: 44.7 % (ref 36.3–47.1)
HDLC SERPL-MCNC: 62 MG/DL
HGB BLD-MCNC: 14.9 G/DL (ref 11.9–15.1)
LDLC SERPL CALC-MCNC: 175 MG/DL (ref 0–100)
MCH RBC QN AUTO: 29 PG (ref 25.2–33.5)
MCHC RBC AUTO-ENTMCNC: 33.3 G/DL (ref 28.4–34.8)
MCV RBC AUTO: 87.1 FL (ref 82.6–102.9)
NRBC BLD-RTO: 0 PER 100 WBC
PLATELET # BLD AUTO: 309 K/UL (ref 138–453)
PMV BLD AUTO: 9.2 FL (ref 8.1–13.5)
POTASSIUM SERPL-SCNC: 4.4 MMOL/L (ref 3.7–5.3)
PROT SERPL-MCNC: 7 G/DL (ref 6.6–8.7)
RBC # BLD AUTO: 5.13 M/UL (ref 3.95–5.11)
SODIUM SERPL-SCNC: 141 MMOL/L (ref 136–145)
TRIGL SERPL-MCNC: 211 MG/DL (ref 0–149)
VLDLC SERPL CALC-MCNC: 42 MG/DL (ref 1–30)
WBC OTHER # BLD: 7.9 K/UL (ref 3.5–11.3)

## 2025-08-28 PROCEDURE — 73562 X-RAY EXAM OF KNEE 3: CPT

## 2025-08-28 PROCEDURE — 80061 LIPID PANEL: CPT

## 2025-08-28 PROCEDURE — 36415 COLL VENOUS BLD VENIPUNCTURE: CPT

## 2025-08-28 PROCEDURE — 85027 COMPLETE CBC AUTOMATED: CPT

## 2025-08-28 PROCEDURE — 80053 COMPREHEN METABOLIC PANEL: CPT
